# Patient Record
Sex: MALE | Race: WHITE | NOT HISPANIC OR LATINO | Employment: FULL TIME | URBAN - METROPOLITAN AREA
[De-identification: names, ages, dates, MRNs, and addresses within clinical notes are randomized per-mention and may not be internally consistent; named-entity substitution may affect disease eponyms.]

---

## 2017-09-01 ENCOUNTER — OFFICE VISIT (OUTPATIENT)
Dept: CARDIOLOGY | Facility: CLINIC | Age: 47
End: 2017-09-01

## 2017-09-01 VITALS
DIASTOLIC BLOOD PRESSURE: 128 MMHG | HEIGHT: 76 IN | HEART RATE: 114 BPM | BODY MASS INDEX: 28.01 KG/M2 | SYSTOLIC BLOOD PRESSURE: 200 MMHG | WEIGHT: 230 LBS | RESPIRATION RATE: 18 BRPM

## 2017-09-01 DIAGNOSIS — I10 ESSENTIAL HYPERTENSION: Primary | ICD-10-CM

## 2017-09-01 PROCEDURE — 93000 ELECTROCARDIOGRAM COMPLETE: CPT | Performed by: INTERNAL MEDICINE

## 2017-09-01 PROCEDURE — 99204 OFFICE O/P NEW MOD 45 MIN: CPT | Performed by: INTERNAL MEDICINE

## 2017-09-01 RX ORDER — AMLODIPINE BESYLATE 5 MG/1
10 TABLET ORAL
COMMUNITY
End: 2017-10-20 | Stop reason: SDUPTHER

## 2017-09-01 RX ORDER — LISINOPRIL 10 MG/1
10 TABLET ORAL DAILY
COMMUNITY
End: 2017-09-01

## 2017-09-01 RX ORDER — LISINOPRIL 20 MG/1
20 TABLET ORAL DAILY
Qty: 90 TABLET | Refills: 3 | Status: SHIPPED | OUTPATIENT
Start: 2017-09-01 | End: 2018-09-06 | Stop reason: SDUPTHER

## 2017-09-01 NOTE — PROGRESS NOTES
Date of Office Visit: 2017  Encounter Provider: Luke Vza MD  Place of Service: [facility] CARDIOLOGY  Patient Name: Johnathon Bright  :1970  [id number]    Chief Complaint   Patient presents with   • Hypertension   • Family History of Heart Dz   :     HPI: Johnathon Bright is a 46 y.o. male  This is a new patient here for evaluation of his blood pressure.  He has had high blood pressure that was diagnosed in July, although he thinks it might have been higher before that. He went in, in July, to [facility] for a sinus infection. He has been started on amlodipine and on lisinopril and says it is like in the 130s-140s now.  He does not have any symptoms of chest pain, shortness of breath, PND, orthopnea, edema, syncope, or palpitations.  He does not think that he is eating a lot of sodium. He chews tobacco.  He drinks Mt. Dew in the morning.  He drinks sweet tea.  He exercises twice a week and does feel better after he exercises.  He has not had any visual problems.  He has not had any blood in his urine.  He has not had any chest pain, shortness of breath, PND, orthopnea, edema, syncope, or palpitations.  He has not had any change in his bowels and he feels like his sodium intake is better, although he eats out 3 times a week. He does not drink excessive alcohol.  He has been checked for sleep apnea and it does not appear that he has it.        Past Medical History:   Diagnosis Date   • Allergy    • Elevated blood pressure    • Sinusitis        Past Surgical History:   Procedure Laterality Date   • ARM HARDWARE REMOVAL         Social History     Social History   • Marital status:      Spouse name: N/A   • Number of children: N/A   • Years of education: N/A     Occupational History   • Not on file.     Social History Main Topics   • Smoking status: Former Smoker     Packs/day: 1.00     Quit date: 2017   • Smokeless tobacco: Current User     Types: Chew      Comment:  "Caffeine - 1   • Alcohol use Yes      Comment: 2 on the weekends   • Drug use: No   • Sexual activity: Not on file     Other Topics Concern   • Not on file     Social History Narrative       Family History   Problem Relation Age of Onset   • Hypertension Mother    • Sudden death Father    • Hypertension Father    • Heart attack Father    • Arrhythmia Paternal Grandmother    • Obesity Paternal Grandmother    • Heart disease Paternal Grandmother    • Pancreatic cancer Paternal Grandfather    • Dementia Paternal Grandfather    • Arrhythmia Sister        Review of Systems   Constitution: Negative for decreased appetite, fever, malaise/fatigue and weight loss.   HENT: Negative for nosebleeds.    Eyes: Negative for double vision.   Cardiovascular: Negative for chest pain, claudication, cyanosis, dyspnea on exertion, irregular heartbeat, leg swelling, near-syncope, orthopnea, palpitations, paroxysmal nocturnal dyspnea and syncope.   Respiratory: Negative for cough, hemoptysis and shortness of breath.    Hematologic/Lymphatic: Negative for bleeding problem.   Skin: Negative for rash.   Musculoskeletal: Negative for falls and myalgias.   Gastrointestinal: Negative for hematochezia, jaundice, melena, nausea and vomiting.   Genitourinary: Negative for hematuria.   Neurological: Negative for dizziness and seizures.   Psychiatric/Behavioral: Negative for altered mental status and memory loss.       No Known Allergies      Current Outpatient Prescriptions:   •  amLODIPine (NORVASC) 5 MG tablet, Take 10 mg by mouth every night at bedtime., Disp: , Rfl:   •  lisinopril (PRINIVIL,ZESTRIL) 20 MG tablet, Take 1 tablet by mouth Daily., Disp: 90 tablet, Rfl: 3     Objective:     Vitals:    09/01/17 1303 09/01/17 1304   BP: 162/92 (!) 200/128   BP Location: Right arm Left arm   Pulse: 114    Resp: 18    Weight: 230 lb (104 kg)    Height: 76\" (193 cm)      Body mass index is 28 kg/(m^2).    Physical Exam   Constitutional: He is oriented " to person, place, and time. He appears well-developed and well-nourished.   HENT:   Head: Normocephalic.   Eyes: No scleral icterus.   Neck: No JVD present. No thyromegaly present.   Cardiovascular: Normal rate, regular rhythm and normal heart sounds.  Exam reveals no gallop and no friction rub.    No murmur heard.  Pulmonary/Chest: Effort normal and breath sounds normal. He has no wheezes. He has no rales.   Abdominal: Soft. There is no hepatosplenomegaly. There is no tenderness.   Musculoskeletal: Normal range of motion. He exhibits no edema.   Lymphadenopathy:     He has no cervical adenopathy.   Neurological: He is alert and oriented to person, place, and time.   Skin: Skin is warm and dry. No rash noted.   Psychiatric: He has a normal mood and affect.         ECG 12 Lead  Date/Time: 9/1/2017 1:31 PM  Performed by: SUNDAR JOE  Authorized by: SUNDAR JOE   Comparison: compared with previous ECG   Similar to previous ECG  Rhythm: sinus tachycardia  Clinical impression: abnormal ECG  Comments: ns ST-T wave abnormality             Assessment:       Diagnosis Plan   1. Essential hypertension            Plan:       I think the patient probably just has essential hypertension. I would like for him to make some lifestyle changes. I have asked him to check his blood pressure once a day, increase his activity, cut the Mountain Dew out, cut the nicotine out, make sure his salt intake his low. I am going to increase his lisinopril to 20 a day. I am going to ask him to come see Marisol Ashley PA-C in about 6 weeks. If his blood pressure is still high, then I probably would add chlorthalidone to this mix, and if it continues to stay high, we would have to consider doing a secondary workup, but I do not think that he has that. I do not know what to make of the differential in the blood pressures from one arm to the next because it is his left arm that is actually higher, but he has a great pulse in his arm. I am okay  watching it.         As always, it has been a pleasure to participate in your patient's care.      Sincerely,       Luke Vaz MD

## 2017-10-20 ENCOUNTER — OFFICE VISIT (OUTPATIENT)
Dept: CARDIOLOGY | Facility: CLINIC | Age: 47
End: 2017-10-20

## 2017-10-20 VITALS
SYSTOLIC BLOOD PRESSURE: 168 MMHG | DIASTOLIC BLOOD PRESSURE: 98 MMHG | OXYGEN SATURATION: 99 % | HEART RATE: 79 BPM | WEIGHT: 230 LBS | HEIGHT: 76 IN | BODY MASS INDEX: 28.01 KG/M2

## 2017-10-20 DIAGNOSIS — I10 ESSENTIAL HYPERTENSION: Primary | ICD-10-CM

## 2017-10-20 PROCEDURE — 93000 ELECTROCARDIOGRAM COMPLETE: CPT | Performed by: PHYSICIAN ASSISTANT

## 2017-10-20 PROCEDURE — 99213 OFFICE O/P EST LOW 20 MIN: CPT | Performed by: PHYSICIAN ASSISTANT

## 2017-10-20 RX ORDER — CHLORTHALIDONE 25 MG/1
12.5 TABLET ORAL DAILY
Qty: 15 TABLET | Refills: 11 | Status: SHIPPED | OUTPATIENT
Start: 2017-10-20 | End: 2017-10-31 | Stop reason: DRUGHIGH

## 2017-10-20 RX ORDER — AMLODIPINE BESYLATE 5 MG/1
10 TABLET ORAL DAILY
Qty: 60 TABLET | Refills: 11 | Status: SHIPPED | OUTPATIENT
Start: 2017-10-20 | End: 2018-10-18 | Stop reason: SDUPTHER

## 2017-10-20 NOTE — PROGRESS NOTES
Date of Office Visit: 10/20/2017  Encounter Provider: KAREN Villalobos  Place of Service: [facility] CARDIOLOGY  Patient Name: Johnathon Bright  :1970    Chief Complaint   Patient presents with   • Hypertension     6 week follow up   :     HPI: Johnathon Bright is a 46 y.o. male, new to me, who presents today for follow-up.  Old records have been obtained and reviewed by me.He is a patient who was first evaluated by Dr. Vaz on 2017 for evaluation of his high blood pressure.  In July he was diagnosed with high blood pressure and started on Norvasc and lisinopril.  He then came to our office for an evaluation.  Dr. Vaz recommended that he really watch his salt intake, stopped drinking at Mountain Dew, quit smoking, and exercise daily.  He also asked him to check his blood pressure once a day and he increased his lisinopril to 20 mg daily.  He is here today for 6 week follow-up.  Dr. Vaz felt that if his blood pressure was still elevated, we could try adding chlorthalidone.   Over the past 6 weeks he is trying to make some changes to his lifestyle.  He is walking 2-1/2 miles or 4 times a week.  He has started to cut back on the smokeless nicotine.  He is down to 1 caffeine free Mountain Dew a day.  He has been really diligent about watching his salt intake, and he has really cut back the amount of times he is eating out a week.  Overall he feels like he is eating a lot of positive changes.  His blood pressure at home runs in the 130s to 140s systolic.  He denies any chest pain, palpitations, shortness of breath, dizziness, edema, or syncope.    Past Medical History:   Diagnosis Date   • Allergy    • Arm fracture    • Elevated blood pressure    • Hypertension    • Sinusitis        Past Surgical History:   Procedure Laterality Date   • ARM HARDWARE REMOVAL         Social History     Social History   • Marital status:      Spouse name: N/A   • Number of children: N/A    • Years of education: N/A     Occupational History   • Not on file.     Social History Main Topics   • Smoking status: Former Smoker     Packs/day: 1.00     Quit date: 02/2017   • Smokeless tobacco: Current User     Types: Chew      Comment: Caffeine - 1   • Alcohol use 0.6 oz/week     1 Shots of liquor per week      Comment: 2 on the weekends   • Drug use: No   • Sexual activity: Defer     Other Topics Concern   • Not on file     Social History Narrative       Family History   Problem Relation Age of Onset   • Hypertension Mother    • Sudden death Father    • Hypertension Father    • Heart attack Father    • Arrhythmia Paternal Grandmother    • Obesity Paternal Grandmother    • Heart disease Paternal Grandmother    • Pancreatic cancer Paternal Grandfather    • Dementia Paternal Grandfather    • Arrhythmia Sister    • No Known Problems Maternal Grandmother    • No Known Problems Maternal Grandfather        Review of Systems   Constitution: Negative for chills, fever, malaise/fatigue, weight gain and weight loss.   HENT: Negative for ear pain, hearing loss, nosebleeds and sore throat.    Eyes: Negative for double vision, pain and visual disturbance.   Cardiovascular: Negative for chest pain, dyspnea on exertion, irregular heartbeat, leg swelling, near-syncope, orthopnea, palpitations, paroxysmal nocturnal dyspnea and syncope.   Respiratory: Negative for cough, shortness of breath, sleep disturbances due to breathing, snoring and wheezing.    Endocrine: Negative for cold intolerance, heat intolerance and polyuria.   Skin: Negative for itching and rash.   Musculoskeletal: Negative for joint pain, joint swelling and myalgias.   Gastrointestinal: Negative for abdominal pain, diarrhea, melena, nausea and vomiting.   Genitourinary: Negative for frequency, hematuria and hesitancy.   Neurological: Negative for excessive daytime sleepiness, headaches, light-headedness, numbness, paresthesias and seizures.  "  Psychiatric/Behavioral: Negative for altered mental status and depression.   Allergic/Immunologic: Negative.    All other systems reviewed and are negative.      No Known Allergies      Current Outpatient Prescriptions:   •  amLODIPine (NORVASC) 5 MG tablet, Take 10 mg by mouth every night at bedtime., Disp: , Rfl:   •  lisinopril (PRINIVIL,ZESTRIL) 20 MG tablet, Take 1 tablet by mouth Daily., Disp: 90 tablet, Rfl: 3     Objective:     Vitals:    10/20/17 1258 10/20/17 1313   BP: 152/100 168/98   BP Location: Right arm Left arm   Pulse: 79    SpO2: 99%    Weight: 230 lb (104 kg)    Height: 76\" (193 cm)      Body mass index is 28 kg/(m^2).    PHYSICAL EXAM:    Physical Exam   Constitutional: He is oriented to person, place, and time. He appears well-developed and well-nourished. No distress.   HENT:   Head: Normocephalic and atraumatic.   Eyes: Pupils are equal, round, and reactive to light.   Neck: No JVD present. No thyromegaly present.   Cardiovascular: Normal rate, regular rhythm, normal heart sounds and intact distal pulses.    No murmur heard.  Pulmonary/Chest: Effort normal and breath sounds normal. No respiratory distress.   Abdominal: Soft. Bowel sounds are normal. He exhibits no distension. There is no splenomegaly or hepatomegaly. There is no tenderness.   Musculoskeletal: Normal range of motion. He exhibits no edema.   Neurological: He is alert and oriented to person, place, and time.   Skin: Skin is warm and dry. He is not diaphoretic. No erythema.   Psychiatric: He has a normal mood and affect. His behavior is normal. Judgment normal.         ECG 12 Lead  Date/Time: 10/20/2017 1:16 PM  Performed by: VEE AVILES.  Authorized by: VEE AVILES.   Comparison: compared with previous ECG from 9/1/2017  Similar to previous ECG  Rhythm: sinus rhythm  BPM: 79  T depression: III and aVF  T flattening: V1  Clinical impression: abnormal ECG  Comments: Indication: Hypertension              Assessment:       " Diagnosis Plan   1. Essential hypertension  ECG 12 Lead     Orders Placed This Encounter   Procedures   • ECG 12 Lead     This order was created via procedure documentation          Plan:       His blood pressure is still elevated today, and it has been a little elevated at home.  I did discuss this with Dr. Vaz.  We are going to start him on chlorthalidone 12.5 mg daily.  He will come back in 10 days for a blood pressure check, I've asked him to bring his cuff with him.  He will also have a BMP drawn at that time as well.  Further recommendations will be made pending those results.    As always, it has been a pleasure to participate in your patient's care.      Sincerely,         Marisol Ashley PA-C

## 2017-10-31 ENCOUNTER — CLINICAL SUPPORT (OUTPATIENT)
Dept: CARDIOLOGY | Facility: CLINIC | Age: 47
End: 2017-10-31

## 2017-10-31 ENCOUNTER — LAB (OUTPATIENT)
Dept: LAB | Facility: HOSPITAL | Age: 47
End: 2017-10-31

## 2017-10-31 DIAGNOSIS — I10 ESSENTIAL HYPERTENSION: ICD-10-CM

## 2017-10-31 LAB
ANION GAP SERPL CALCULATED.3IONS-SCNC: 15.1 MMOL/L
BUN BLD-MCNC: 17 MG/DL (ref 6–20)
BUN/CREAT SERPL: 14.2 (ref 7–25)
CALCIUM SPEC-SCNC: 9.7 MG/DL (ref 8.6–10.5)
CHLORIDE SERPL-SCNC: 96 MMOL/L (ref 98–107)
CO2 SERPL-SCNC: 25.9 MMOL/L (ref 22–29)
CREAT BLD-MCNC: 1.2 MG/DL (ref 0.76–1.27)
GFR SERPL CREATININE-BSD FRML MDRD: 66 ML/MIN/1.73
GLUCOSE BLD-MCNC: 109 MG/DL (ref 65–99)
POTASSIUM BLD-SCNC: 4.2 MMOL/L (ref 3.5–5.2)
SODIUM BLD-SCNC: 137 MMOL/L (ref 136–145)

## 2017-10-31 PROCEDURE — 80048 BASIC METABOLIC PNL TOTAL CA: CPT

## 2017-10-31 PROCEDURE — 36415 COLL VENOUS BLD VENIPUNCTURE: CPT

## 2017-10-31 RX ORDER — CHLORTHALIDONE 25 MG/1
TABLET ORAL
Qty: 45 TABLET | Refills: 0 | Status: SHIPPED | OUTPATIENT
Start: 2017-10-31 | End: 2019-01-10 | Stop reason: SDUPTHER

## 2018-01-15 ENCOUNTER — OFFICE VISIT (OUTPATIENT)
Dept: CARDIOLOGY | Facility: CLINIC | Age: 48
End: 2018-01-15

## 2018-01-15 VITALS
DIASTOLIC BLOOD PRESSURE: 82 MMHG | BODY MASS INDEX: 28.23 KG/M2 | HEART RATE: 90 BPM | WEIGHT: 231.8 LBS | SYSTOLIC BLOOD PRESSURE: 124 MMHG | HEIGHT: 76 IN

## 2018-01-15 DIAGNOSIS — I10 ESSENTIAL HYPERTENSION: Primary | ICD-10-CM

## 2018-01-15 PROCEDURE — 99213 OFFICE O/P EST LOW 20 MIN: CPT | Performed by: INTERNAL MEDICINE

## 2018-01-15 PROCEDURE — 93000 ELECTROCARDIOGRAM COMPLETE: CPT | Performed by: INTERNAL MEDICINE

## 2018-01-15 NOTE — PROGRESS NOTES
Date of Office Visit: 01/15/2018  Encounter Provider: Luke Vaz MD  Place of Service: [facility] CARDIOLOGY  Patient Name: Johnathon Bright  :1970  [id number]    Chief Complaint   Patient presents with   • Hypertension   :     HPI: Johnathon Bright is a 47 y.o. male  This is a gentleman is here for followup of hypertension.  He has been doing well.  His blood pressure runs 127 or so over 77 he said on average; it may fluctuate a couple points here or there.  He will get a little lightheaded sometimes when he bends over and stands up.  He is also having erectile dysfunction issues, which he has had before.  The good news is that he has stopped smoking, but the erectile dysfunction problem is obviously a major issue for him.         Past Medical History:   Diagnosis Date   • Allergy    • Arm fracture    • Elevated blood pressure    • Hypertension    • Sinusitis        Past Surgical History:   Procedure Laterality Date   • ARM HARDWARE REMOVAL         Social History     Social History   • Marital status:      Spouse name: N/A   • Number of children: N/A   • Years of education: N/A     Occupational History   • Not on file.     Social History Main Topics   • Smoking status: Former Smoker     Packs/day: 1.00     Quit date: 2017   • Smokeless tobacco: Current User     Types: Chew      Comment: Caffeine - 1   • Alcohol use 0.6 oz/week     1 Shots of liquor per week      Comment: 2 on the weekends   • Drug use: No   • Sexual activity: Defer     Other Topics Concern   • Not on file     Social History Narrative       Family History   Problem Relation Age of Onset   • Hypertension Mother    • Sudden death Father    • Hypertension Father    • Heart attack Father    • Arrhythmia Paternal Grandmother    • Obesity Paternal Grandmother    • Heart disease Paternal Grandmother    • Pancreatic cancer Paternal Grandfather    • Dementia Paternal Grandfather    • Arrhythmia Sister    • No  "Known Problems Maternal Grandmother    • No Known Problems Maternal Grandfather        Review of Systems   Constitution: Negative for decreased appetite, fever, malaise/fatigue and weight loss.   HENT: Negative for nosebleeds.    Eyes: Negative for double vision.   Cardiovascular: Negative for chest pain, claudication, cyanosis, dyspnea on exertion, irregular heartbeat, leg swelling, near-syncope, orthopnea, palpitations, paroxysmal nocturnal dyspnea and syncope.   Respiratory: Negative for cough, hemoptysis and shortness of breath.    Hematologic/Lymphatic: Negative for bleeding problem.   Skin: Negative for rash.   Musculoskeletal: Negative for falls and myalgias.   Gastrointestinal: Negative for hematochezia, jaundice, melena, nausea and vomiting.   Genitourinary: Negative for hematuria.   Neurological: Negative for dizziness and seizures.   Psychiatric/Behavioral: Negative for altered mental status and memory loss.       No Known Allergies      Current Outpatient Prescriptions:   •  amLODIPine (NORVASC) 5 MG tablet, Take 2 tablets by mouth Daily., Disp: 60 tablet, Rfl: 11  •  chlorthalidone (HYGROTON) 25 MG tablet, Take 1/2 tablet daily, Disp: 45 tablet, Rfl: 0  •  lisinopril (PRINIVIL,ZESTRIL) 20 MG tablet, Take 1 tablet by mouth Daily., Disp: 90 tablet, Rfl: 3     Objective:     Vitals:    01/15/18 1424   BP: 124/82   Pulse: 90   Weight: 105 kg (231 lb 12.8 oz)   Height: 193 cm (76\")     Body mass index is 28.22 kg/(m^2).    Physical Exam   Constitutional: He is oriented to person, place, and time. He appears well-developed and well-nourished.   HENT:   Head: Normocephalic.   Eyes: No scleral icterus.   Neck: No JVD present. No thyromegaly present.   Cardiovascular: Normal rate, regular rhythm and normal heart sounds.  Exam reveals no gallop and no friction rub.    No murmur heard.  Pulmonary/Chest: Effort normal and breath sounds normal. He has no wheezes. He has no rales.   Abdominal: Soft. There is no " hepatosplenomegaly. There is no tenderness.   Musculoskeletal: Normal range of motion. He exhibits no edema.   Lymphadenopathy:     He has no cervical adenopathy.   Neurological: He is alert and oriented to person, place, and time.   Skin: Skin is warm and dry. No rash noted.   Psychiatric: He has a normal mood and affect.         ECG 12 Lead  Date/Time: 1/15/2018 3:05 PM  Performed by: SUNDAR VAZ  Authorized by: SUNDAR VAZ   Comparison: compared with previous ECG   Similar to previous ECG  Rhythm: sinus rhythm             Assessment:       Diagnosis Plan   1. Essential hypertension            Plan:       He is doing well.  He has made a lot of positive changes to get better.  I am not going to make any change today.  He is having some orthostasis when he bends over and stands up.  I am going to have him come back and see us as needed.  He could definitely take erectile dysfunction medicine, if he needed to, from a cardiac standpoint.        As always, it has been a pleasure to participate in your patient's care.      Sincerely,       Sundar Vaz MD

## 2018-09-06 RX ORDER — LISINOPRIL 20 MG/1
20 TABLET ORAL DAILY
Qty: 90 TABLET | Refills: 0 | Status: SHIPPED | OUTPATIENT
Start: 2018-09-06 | End: 2018-12-04 | Stop reason: SDUPTHER

## 2018-10-19 RX ORDER — AMLODIPINE BESYLATE 5 MG/1
10 TABLET ORAL DAILY
Qty: 60 TABLET | Refills: 3 | Status: SHIPPED | OUTPATIENT
Start: 2018-10-19 | End: 2019-02-16 | Stop reason: SDUPTHER

## 2018-12-05 RX ORDER — LISINOPRIL 20 MG/1
20 TABLET ORAL DAILY
Qty: 90 TABLET | Refills: 0 | Status: SHIPPED | OUTPATIENT
Start: 2018-12-05 | End: 2019-02-28 | Stop reason: SDUPTHER

## 2019-01-10 RX ORDER — CHLORTHALIDONE 25 MG/1
TABLET ORAL
Qty: 45 TABLET | Refills: 0 | Status: SHIPPED | OUTPATIENT
Start: 2019-01-10 | End: 2019-02-28 | Stop reason: SDUPTHER

## 2019-02-18 RX ORDER — AMLODIPINE BESYLATE 5 MG/1
10 TABLET ORAL DAILY
Qty: 60 TABLET | Refills: 0 | Status: SHIPPED | OUTPATIENT
Start: 2019-02-18 | End: 2019-02-18 | Stop reason: SDUPTHER

## 2019-02-18 RX ORDER — AMLODIPINE BESYLATE 5 MG/1
10 TABLET ORAL DAILY
Qty: 60 TABLET | Refills: 0 | Status: SHIPPED | OUTPATIENT
Start: 2019-02-18 | End: 2019-02-28 | Stop reason: SDUPTHER

## 2019-02-18 NOTE — TELEPHONE ENCOUNTER
Ghazal can you please call and schedule a follow up appointment.  Marisol chavez I send in one month for the patient

## 2019-02-20 RX ORDER — AMLODIPINE BESYLATE 5 MG/1
10 TABLET ORAL DAILY
Qty: 180 TABLET | Refills: 0 | Status: SHIPPED | OUTPATIENT
Start: 2019-02-20 | End: 2019-02-28

## 2019-02-28 ENCOUNTER — OFFICE VISIT (OUTPATIENT)
Dept: CARDIOLOGY | Facility: CLINIC | Age: 49
End: 2019-02-28

## 2019-02-28 ENCOUNTER — LAB (OUTPATIENT)
Dept: LAB | Facility: HOSPITAL | Age: 49
End: 2019-02-28

## 2019-02-28 VITALS
SYSTOLIC BLOOD PRESSURE: 128 MMHG | WEIGHT: 238 LBS | BODY MASS INDEX: 29.59 KG/M2 | OXYGEN SATURATION: 99 % | HEIGHT: 75 IN | HEART RATE: 117 BPM | DIASTOLIC BLOOD PRESSURE: 78 MMHG

## 2019-02-28 DIAGNOSIS — I10 ESSENTIAL HYPERTENSION: ICD-10-CM

## 2019-02-28 DIAGNOSIS — R00.0 TACHYCARDIA: ICD-10-CM

## 2019-02-28 DIAGNOSIS — I10 ESSENTIAL HYPERTENSION: Primary | ICD-10-CM

## 2019-02-28 LAB
ALBUMIN SERPL-MCNC: 4.6 G/DL (ref 3.5–5.2)
ALBUMIN/GLOB SERPL: 1.4 G/DL
ALP SERPL-CCNC: 48 U/L (ref 39–117)
ALT SERPL W P-5'-P-CCNC: 34 U/L (ref 1–41)
ANION GAP SERPL CALCULATED.3IONS-SCNC: 10 MMOL/L
AST SERPL-CCNC: 18 U/L (ref 1–40)
BILIRUB SERPL-MCNC: 0.3 MG/DL (ref 0.1–1.2)
BUN BLD-MCNC: 13 MG/DL (ref 6–20)
BUN/CREAT SERPL: 11.6 (ref 7–25)
CALCIUM SPEC-SCNC: 10.1 MG/DL (ref 8.6–10.5)
CHLORIDE SERPL-SCNC: 100 MMOL/L (ref 98–107)
CO2 SERPL-SCNC: 27 MMOL/L (ref 22–29)
CREAT BLD-MCNC: 1.12 MG/DL (ref 0.76–1.27)
DEPRECATED RDW RBC AUTO: 39.9 FL (ref 37–54)
ERYTHROCYTE [DISTWIDTH] IN BLOOD BY AUTOMATED COUNT: 12.4 % (ref 12.3–15.4)
GFR SERPL CREATININE-BSD FRML MDRD: 71 ML/MIN/1.73
GLOBULIN UR ELPH-MCNC: 3.3 GM/DL
GLUCOSE BLD-MCNC: 122 MG/DL (ref 65–99)
HCT VFR BLD AUTO: 46.7 % (ref 37.5–51)
HGB BLD-MCNC: 15.5 G/DL (ref 13–17.7)
MCH RBC QN AUTO: 29.2 PG (ref 26.6–33)
MCHC RBC AUTO-ENTMCNC: 33.2 G/DL (ref 31.5–35.7)
MCV RBC AUTO: 88.1 FL (ref 79–97)
PLATELET # BLD AUTO: 362 10*3/MM3 (ref 140–450)
PMV BLD AUTO: 8.8 FL (ref 6–12)
POTASSIUM BLD-SCNC: 4.4 MMOL/L (ref 3.5–5.2)
PROT SERPL-MCNC: 7.9 G/DL (ref 6–8.5)
RBC # BLD AUTO: 5.3 10*6/MM3 (ref 4.14–5.8)
SODIUM BLD-SCNC: 137 MMOL/L (ref 136–145)
T-UPTAKE NFR SERPL: 0.98 TBI (ref 0.8–1.3)
T4 SERPL-MCNC: 5.57 MCG/DL (ref 4.5–11.7)
TSH SERPL DL<=0.05 MIU/L-ACNC: 1.07 MIU/ML (ref 0.27–4.2)
WBC NRBC COR # BLD: 8.82 10*3/MM3 (ref 3.4–10.8)

## 2019-02-28 PROCEDURE — 85027 COMPLETE CBC AUTOMATED: CPT

## 2019-02-28 PROCEDURE — 80053 COMPREHEN METABOLIC PANEL: CPT

## 2019-02-28 PROCEDURE — 36415 COLL VENOUS BLD VENIPUNCTURE: CPT

## 2019-02-28 PROCEDURE — 99214 OFFICE O/P EST MOD 30 MIN: CPT | Performed by: NURSE PRACTITIONER

## 2019-02-28 PROCEDURE — 84443 ASSAY THYROID STIM HORMONE: CPT

## 2019-02-28 PROCEDURE — 84479 ASSAY OF THYROID (T3 OR T4): CPT

## 2019-02-28 PROCEDURE — 93000 ELECTROCARDIOGRAM COMPLETE: CPT | Performed by: NURSE PRACTITIONER

## 2019-02-28 PROCEDURE — 84436 ASSAY OF TOTAL THYROXINE: CPT

## 2019-02-28 RX ORDER — LISINOPRIL 20 MG/1
20 TABLET ORAL DAILY
Qty: 90 TABLET | Refills: 0 | Status: SHIPPED | OUTPATIENT
Start: 2019-02-28 | End: 2019-03-26 | Stop reason: SDUPTHER

## 2019-02-28 RX ORDER — LISINOPRIL 20 MG/1
20 TABLET ORAL DAILY
Qty: 30 TABLET | Refills: 11 | Status: SHIPPED | OUTPATIENT
Start: 2019-02-28 | End: 2020-02-28

## 2019-02-28 RX ORDER — AMLODIPINE BESYLATE 5 MG/1
10 TABLET ORAL DAILY
Qty: 60 TABLET | Refills: 11 | Status: SHIPPED | OUTPATIENT
Start: 2019-02-28 | End: 2019-03-26 | Stop reason: SDUPTHER

## 2019-02-28 RX ORDER — CHLORTHALIDONE 25 MG/1
TABLET ORAL
Qty: 45 TABLET | Refills: 11 | Status: SHIPPED | OUTPATIENT
Start: 2019-02-28 | End: 2020-04-02

## 2019-02-28 NOTE — PROGRESS NOTES
Date of Office Visit: 2019  Encounter Provider: ROBIN Tena  Place of Service: [facility] CARDIOLOGY  Patient Name: Johnathon Bright  :1970    Chief Complaint   Patient presents with   • Hypertension     1 year follow up   :     HPI: Johnathon Bright is a 48 y.o. male, new to me, who presents today for follow-up.  Old records have been obtained and reviewed by me.  He is a patient of Dr. Vaz's with a past cardiac history significant for hypertension.  He was last seen in the office on 1/15/2018 and at that time his blood pressure had been doing well in the 120s systolic.  He had made a lot of positive changes in his lifestyle including walking and cutting back on smokeless nicotine and Mountain Dew.  He was having some occasional orthostasis when bending over and standing up.  His biggest complaint was some erectile dysfunction.  No changes were made to his medical regimen and he is here today for a one-year follow-up.   Over the past year he has been doing well.  He denies any chest pain, shortness of air, palpitations, edema, and syncope.  He does experience occasional lightheadedness after bending over and standing up too quickly.  His activity has been decreased since December both due to the weather and the fact that he has been pretty busy.  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  Additionally, he has been eating out more frequently due to the busy schedule.  He still occasionally uses a smokeless nicotine.  He reports that his caffeine intake includes one Mountain Dew per day.    Past Medical History:   Diagnosis Date   • Allergy    • Arm fracture    • Elevated blood pressure    • Hypertension    • Sinusitis        Past Surgical History:   Procedure Laterality Date   • ARM HARDWARE REMOVAL         Social History     Socioeconomic History   • Marital status:      Spouse name: Not on file   •  Number of children: Not on file   • Years of education: Not on file   • Highest education level: Not on file   Social Needs   • Financial resource strain: Not on file   • Food insecurity - worry: Not on file   • Food insecurity - inability: Not on file   • Transportation needs - medical: Not on file   • Transportation needs - non-medical: Not on file   Occupational History   • Not on file   Tobacco Use   • Smoking status: Former Smoker     Packs/day: 1.00     Last attempt to quit: 2017     Years since quittin.0   • Smokeless tobacco: Current User     Types: Chew   • Tobacco comment: Caffeine - 1   Substance and Sexual Activity   • Alcohol use: Yes     Alcohol/week: 0.6 oz     Types: 1 Shots of liquor per week     Comment: 2 on the weekends   • Drug use: No   • Sexual activity: Defer   Other Topics Concern   • Not on file   Social History Narrative   • Not on file       Family History   Problem Relation Age of Onset   • Hypertension Mother    • Sudden death Father    • Hypertension Father    • Heart attack Father    • Arrhythmia Paternal Grandmother    • Obesity Paternal Grandmother    • Heart disease Paternal Grandmother    • Pancreatic cancer Paternal Grandfather    • Dementia Paternal Grandfather    • Arrhythmia Sister    • No Known Problems Maternal Grandmother    • No Known Problems Maternal Grandfather        Review of Systems   Constitution: Negative for chills, fever and malaise/fatigue.   Cardiovascular: Negative for chest pain, dyspnea on exertion, leg swelling, near-syncope, orthopnea, palpitations, paroxysmal nocturnal dyspnea and syncope.   Respiratory: Negative for cough and shortness of breath.    Musculoskeletal: Negative for joint pain, joint swelling and myalgias.   Gastrointestinal: Negative for abdominal pain, diarrhea, melena, nausea and vomiting.   Genitourinary: Negative for frequency and hematuria.   Neurological: Positive for light-headedness. Negative for numbness, paresthesias and  "seizures.   Allergic/Immunologic: Negative.    All other systems reviewed and are negative.      No Known Allergies      Current Outpatient Medications:   •  amLODIPine (NORVASC) 5 MG tablet, Take 2 tablets by mouth Daily., Disp: 60 tablet, Rfl: 11  •  chlorthalidone (HYGROTON) 25 MG tablet, TAKE 1/2 TABLET BY MOUTH DAILY, Disp: 45 tablet, Rfl: 11  •  lisinopril (PRINIVIL,ZESTRIL) 20 MG tablet, Take 1 tablet by mouth Daily., Disp: 30 tablet, Rfl: 11      Objective:     Vitals:    02/28/19 1105 02/28/19 1111   BP: 130/76 128/78   BP Location: Right arm Left arm   Pulse: 117    SpO2: 99%    Weight: 108 kg (238 lb)    Height: 190.5 cm (75\")      Body mass index is 29.75 kg/m².    PHYSICAL EXAM:    Physical Exam   Constitutional: He is oriented to person, place, and time. He appears well-developed and well-nourished. No distress.   HENT:   Head: Normocephalic and atraumatic.   Eyes: Pupils are equal, round, and reactive to light.   Neck: No JVD present. No thyromegaly present.   Cardiovascular: Regular rhythm, normal heart sounds and intact distal pulses. Tachycardia present.   No murmur heard.  Pulmonary/Chest: Effort normal and breath sounds normal. No respiratory distress.   Abdominal: Soft. Bowel sounds are normal. He exhibits no distension. There is no splenomegaly or hepatomegaly. There is no tenderness.   Musculoskeletal: Normal range of motion. He exhibits no edema.   Neurological: He is alert and oriented to person, place, and time.   Skin: Skin is warm and dry. He is not diaphoretic. No erythema.   Psychiatric: He has a normal mood and affect. His behavior is normal. Judgment normal.         ECG 12 Lead  Date/Time: 2/28/2019 11:21 AM  Performed by: Fadumo Moreno APRN  Authorized by: Fadumo Moreno APRN   Comparison: compared with previous ECG from 1/15/2018  Similar to previous ECG  Rhythm: sinus tachycardia  Rate: tachycardic  BPM: 104  T inversion: III    Clinical impression: abnormal " EKG  Comments: Indication: Hypertension              Assessment:       Diagnosis Plan   1. Essential hypertension  ECG 12 Lead    CBC (No Diff)    Comprehensive Metabolic Panel    Thyroid Panel With TSH    Adult Transthoracic Echo Complete W/ Cont if Necessary Per Protocol   2. Tachycardia  CBC (No Diff)    Comprehensive Metabolic Panel    Thyroid Panel With TSH    Adult Transthoracic Echo Complete W/ Cont if Necessary Per Protocol     Orders Placed This Encounter   Procedures   • CBC (No Diff)     Standing Status:   Future     Standing Expiration Date:   2/28/2020   • Comprehensive Metabolic Panel     Standing Status:   Future     Standing Expiration Date:   2/28/2020   • Thyroid Panel With TSH     Standing Status:   Future     Standing Expiration Date:   2/28/2020   • ECG 12 Lead     This order was created via procedure documentation   • Adult Transthoracic Echo Complete W/ Cont if Necessary Per Protocol     tachycardia     Standing Status:   Future     Order Specific Question:   Reason for exam?     Answer:   Other Reasons     Order Specific Question:   Other reason(s)?     Answer:   Additional Reasons     Order Specific Question:   Additional reason(s)?     Answer:   Reasons Uncertain in Most Circumstances     Order Specific Question:   Other Reasons (uncertain in most circumstances)     Answer:   Other (Please Comment)          Plan:       1.  Hypertension.  His blood pressure looks great today.  He states they have been well controlled at home as well.  Continue current regimen      2.  Tachycardia.  He is tachycardic today, and it looks as though the heart rate has been trending up over the past few years.  He did have a Mountain Dew before his appointment, and also states that he always gets nervous before coming to the doctor.  However, he did bring his home blood pressure cuff in with him today which also measures his heart rate and his heart rate is consistently in the 100s-1 teens at rest. He denies any  palpitations.  I am not sure why he is tachycardic, so I am going to investigate it further.  I am going to order some labs on him including a CBC, CMP, and thyroid panel.  I am also going to order an echocardiogram.  I did discuss the plan of care with Dr. Rodriguez and he is in agreement.  Further recommendations will be made pending the results of these tests.  I will have him make an appointment to follow-up with Dr. Vaz in 1 year and we will see him sooner if needed.        As always, it has been a pleasure to participate in your patient's care.      Sincerely,         ROBIN Singh

## 2019-03-08 ENCOUNTER — HOSPITAL ENCOUNTER (OUTPATIENT)
Dept: CARDIOLOGY | Facility: HOSPITAL | Age: 49
Discharge: HOME | End: 2019-03-08
Admitting: NURSE PRACTITIONER

## 2019-03-08 VITALS
HEIGHT: 75 IN | WEIGHT: 238 LBS | SYSTOLIC BLOOD PRESSURE: 147 MMHG | HEART RATE: 117 BPM | DIASTOLIC BLOOD PRESSURE: 83 MMHG | BODY MASS INDEX: 29.59 KG/M2

## 2019-03-08 DIAGNOSIS — I10 ESSENTIAL HYPERTENSION: ICD-10-CM

## 2019-03-08 DIAGNOSIS — R00.0 TACHYCARDIA: ICD-10-CM

## 2019-03-08 LAB
ASCENDING AORTA: 2.6 CM
BH CV ECHO MEAS - ACS: 2.1 CM
BH CV ECHO MEAS - AO MAX PG (FULL): 2.3 MMHG
BH CV ECHO MEAS - AO MAX PG: 12.4 MMHG
BH CV ECHO MEAS - AO MEAN PG (FULL): 0.1 MMHG
BH CV ECHO MEAS - AO MEAN PG: 5.8 MMHG
BH CV ECHO MEAS - AO ROOT AREA (BSA CORRECTED): 1.5
BH CV ECHO MEAS - AO ROOT AREA: 10.2 CM^2
BH CV ECHO MEAS - AO ROOT DIAM: 3.6 CM
BH CV ECHO MEAS - AO V2 MAX: 175.9 CM/SEC
BH CV ECHO MEAS - AO V2 MEAN: 110.8 CM/SEC
BH CV ECHO MEAS - AO V2 VTI: 22.8 CM
BH CV ECHO MEAS - AVA(I,A): 2.9 CM^2
BH CV ECHO MEAS - AVA(I,D): 2.9 CM^2
BH CV ECHO MEAS - AVA(V,A): 2.8 CM^2
BH CV ECHO MEAS - AVA(V,D): 2.8 CM^2
BH CV ECHO MEAS - BSA(HAYCOCK): 2.4 M^2
BH CV ECHO MEAS - BSA: 2.4 M^2
BH CV ECHO MEAS - BZI_BMI: 29.7 KILOGRAMS/M^2
BH CV ECHO MEAS - BZI_METRIC_HEIGHT: 190.5 CM
BH CV ECHO MEAS - BZI_METRIC_WEIGHT: 108 KG
BH CV ECHO MEAS - EDV(MOD-SP2): 70 ML
BH CV ECHO MEAS - EDV(MOD-SP4): 105 ML
BH CV ECHO MEAS - EDV(TEICH): 159.5 ML
BH CV ECHO MEAS - EF(CUBED): 67.8 %
BH CV ECHO MEAS - EF(MOD-BP): 57 %
BH CV ECHO MEAS - EF(MOD-SP2): 57.1 %
BH CV ECHO MEAS - EF(MOD-SP4): 56.2 %
BH CV ECHO MEAS - EF(TEICH): 58.6 %
BH CV ECHO MEAS - ESV(MOD-SP2): 30 ML
BH CV ECHO MEAS - ESV(MOD-SP4): 46 ML
BH CV ECHO MEAS - ESV(TEICH): 66 ML
BH CV ECHO MEAS - FS: 31.4 %
BH CV ECHO MEAS - IVS/LVPW: 1
BH CV ECHO MEAS - IVSD: 1 CM
BH CV ECHO MEAS - LAT PEAK E' VEL: 9 CM/SEC
BH CV ECHO MEAS - LV DIASTOLIC VOL/BSA (35-75): 44.4 ML/M^2
BH CV ECHO MEAS - LV MASS(C)D: 231.7 GRAMS
BH CV ECHO MEAS - LV MASS(C)DI: 98.1 GRAMS/M^2
BH CV ECHO MEAS - LV MAX PG: 10.1 MMHG
BH CV ECHO MEAS - LV MEAN PG: 5.7 MMHG
BH CV ECHO MEAS - LV SYSTOLIC VOL/BSA (12-30): 19.5 ML/M^2
BH CV ECHO MEAS - LV V1 MAX: 159.1 CM/SEC
BH CV ECHO MEAS - LV V1 MEAN: 113.9 CM/SEC
BH CV ECHO MEAS - LV V1 VTI: 21.1 CM
BH CV ECHO MEAS - LVIDD: 5.7 CM
BH CV ECHO MEAS - LVIDS: 3.9 CM
BH CV ECHO MEAS - LVLD AP2: 6.2 CM
BH CV ECHO MEAS - LVLD AP4: 7.3 CM
BH CV ECHO MEAS - LVLS AP2: 6.3 CM
BH CV ECHO MEAS - LVLS AP4: 6.3 CM
BH CV ECHO MEAS - LVOT AREA (M): 3.1 CM^2
BH CV ECHO MEAS - LVOT AREA: 3.1 CM^2
BH CV ECHO MEAS - LVOT DIAM: 2 CM
BH CV ECHO MEAS - LVPWD: 1 CM
BH CV ECHO MEAS - MED PEAK E' VEL: 8 CM/SEC
BH CV ECHO MEAS - MV A DUR: 0.08 SEC
BH CV ECHO MEAS - MV A MAX VEL: 100.4 CM/SEC
BH CV ECHO MEAS - MV DEC SLOPE: 506.5 CM/SEC^2
BH CV ECHO MEAS - MV DEC TIME: 0.12 SEC
BH CV ECHO MEAS - MV E MAX VEL: 62.1 CM/SEC
BH CV ECHO MEAS - MV E/A: 0.62
BH CV ECHO MEAS - MV MAX PG: 5.1 MMHG
BH CV ECHO MEAS - MV MEAN PG: 3 MMHG
BH CV ECHO MEAS - MV P1/2T MAX VEL: 63.1 CM/SEC
BH CV ECHO MEAS - MV P1/2T: 36.5 MSEC
BH CV ECHO MEAS - MV V2 MAX: 112.9 CM/SEC
BH CV ECHO MEAS - MV V2 MEAN: 82.7 CM/SEC
BH CV ECHO MEAS - MV V2 VTI: 18.9 CM
BH CV ECHO MEAS - MVA P1/2T LCG: 3.5 CM^2
BH CV ECHO MEAS - MVA(P1/2T): 6 CM^2
BH CV ECHO MEAS - MVA(VTI): 3.5 CM^2
BH CV ECHO MEAS - PA ACC TIME: 0.11 SEC
BH CV ECHO MEAS - PA MAX PG (FULL): 9.8 MMHG
BH CV ECHO MEAS - PA MAX PG: 13 MMHG
BH CV ECHO MEAS - PA PR(ACCEL): 31.5 MMHG
BH CV ECHO MEAS - PA V2 MAX: 180.3 CM/SEC
BH CV ECHO MEAS - PULM A REVS DUR: 0.1 SEC
BH CV ECHO MEAS - PULM A REVS VEL: 42.2 CM/SEC
BH CV ECHO MEAS - PULM DIAS VEL: 50.9 CM/SEC
BH CV ECHO MEAS - PULM S/D: 1.5
BH CV ECHO MEAS - PULM SYS VEL: 77.1 CM/SEC
BH CV ECHO MEAS - PVA(V,A): 1.3 CM^2
BH CV ECHO MEAS - PVA(V,D): 1.3 CM^2
BH CV ECHO MEAS - QP/QS: 0.49
BH CV ECHO MEAS - RV MAX PG: 3.2 MMHG
BH CV ECHO MEAS - RV MEAN PG: 1.6 MMHG
BH CV ECHO MEAS - RV V1 MAX: 88.8 CM/SEC
BH CV ECHO MEAS - RV V1 MEAN: 57 CM/SEC
BH CV ECHO MEAS - RV V1 VTI: 12.2 CM
BH CV ECHO MEAS - RVOT AREA: 2.7 CM^2
BH CV ECHO MEAS - RVOT DIAM: 1.8 CM
BH CV ECHO MEAS - SI(AO): 98.3 ML/M^2
BH CV ECHO MEAS - SI(CUBED): 52.9 ML/M^2
BH CV ECHO MEAS - SI(LVOT): 27.9 ML/M^2
BH CV ECHO MEAS - SI(MOD-SP2): 16.9 ML/M^2
BH CV ECHO MEAS - SI(MOD-SP4): 25 ML/M^2
BH CV ECHO MEAS - SI(TEICH): 39.6 ML/M^2
BH CV ECHO MEAS - SUP REN AO DIAM: 2.1 CM
BH CV ECHO MEAS - SV(AO): 232.2 ML
BH CV ECHO MEAS - SV(CUBED): 125 ML
BH CV ECHO MEAS - SV(LVOT): 65.9 ML
BH CV ECHO MEAS - SV(MOD-SP2): 40 ML
BH CV ECHO MEAS - SV(MOD-SP4): 59 ML
BH CV ECHO MEAS - SV(RVOT): 32.5 ML
BH CV ECHO MEAS - SV(TEICH): 93.5 ML
BH CV ECHO MEAS - TAPSE (>1.6): 1.8 CM2
BH CV ECHO MEASUREMENTS AVERAGE E/E' RATIO: 7.31
BH CV XLRA - RV BASE: 3.4 CM
BH CV XLRA - TDI S': 18 CM/SEC
LEFT ATRIUM VOLUME INDEX: 19 ML/M2
LV EF 2D ECHO EST: 57 %
MAXIMAL PREDICTED HEART RATE: 174 BPM
SINUS: 3 CM
STJ: 2.5 CM
STRESS TARGET HR: 148 BPM

## 2019-03-08 PROCEDURE — 93306 TTE W/DOPPLER COMPLETE: CPT | Performed by: INTERNAL MEDICINE

## 2019-03-08 PROCEDURE — 93306 TTE W/DOPPLER COMPLETE: CPT

## 2019-03-12 ENCOUNTER — TELEPHONE (OUTPATIENT)
Dept: CARDIOLOGY | Facility: CLINIC | Age: 49
End: 2019-03-12

## 2019-03-12 RX ORDER — ATENOLOL 25 MG/1
25 TABLET ORAL DAILY
Qty: 30 TABLET | Refills: 11 | Status: SHIPPED | OUTPATIENT
Start: 2019-03-12 | End: 2020-03-10

## 2019-03-12 NOTE — TELEPHONE ENCOUNTER
Left a message for the patient to call me back regarding his echo results and plan.  I'm going to go ahead and place an order for atenolol 25 mg daily.  I will explain this to his when he calls back.

## 2019-03-12 NOTE — TELEPHONE ENCOUNTER
I spoke to the patient regarding the results of his echocardiogram.  I also told him that I was going to order atenolol.  I would like for him to come back and see me in 2 weeks.  Ghazal, can you arrange this appointment and give the patient a call please?

## 2019-03-19 RX ORDER — AMLODIPINE BESYLATE 5 MG/1
10 TABLET ORAL DAILY
Qty: 60 TABLET | Refills: 0 | Status: SHIPPED | OUTPATIENT
Start: 2019-03-19 | End: 2019-04-17 | Stop reason: SDUPTHER

## 2019-03-26 ENCOUNTER — OFFICE VISIT (OUTPATIENT)
Dept: CARDIOLOGY | Facility: CLINIC | Age: 49
End: 2019-03-26

## 2019-03-26 VITALS
BODY MASS INDEX: 29.22 KG/M2 | WEIGHT: 235 LBS | OXYGEN SATURATION: 99 % | HEART RATE: 79 BPM | HEIGHT: 75 IN | DIASTOLIC BLOOD PRESSURE: 92 MMHG | SYSTOLIC BLOOD PRESSURE: 146 MMHG

## 2019-03-26 DIAGNOSIS — I10 ESSENTIAL HYPERTENSION: Primary | ICD-10-CM

## 2019-03-26 PROCEDURE — 99213 OFFICE O/P EST LOW 20 MIN: CPT | Performed by: NURSE PRACTITIONER

## 2019-03-26 PROCEDURE — 93000 ELECTROCARDIOGRAM COMPLETE: CPT | Performed by: NURSE PRACTITIONER

## 2019-03-26 NOTE — PROGRESS NOTES
Date of Office Visit: 2019  Encounter Provider: ROBIN Tena  Place of Service: [facility] CARDIOLOGY  Patient Name: Johnathon Bright  :1970    Chief Complaint   Patient presents with   • Essential hypertension   :     HPI: Johnathon Bright is a 48 y.o. male who presents today for follow-up.  Old records have been obtained and reviewed by me.  He is a patient of Dr. Gamino with a past cardiac history significant for hypertension.  I last saw him on 2019 and at that visit he was doing well with no complaints of angina or heart failure.  However, I did note him to be tachycardic on his EKG and his heart rate readinds on his home blood pressure cuff had been consistenly in the 1 teens.  I ordered some lab work and an echocardiogram all of which were unrevealing.  I started him on atenolol 25 mg daily and advised him to follow-up with me in 2 weeks.   Since starting on the atenolol he has been doing really well.  He denies any chest pain, shortness of air, palpitations, edema, dizziness or lightheadedness, and syncope.  His blood pressures at home have been consistently running in the 120s-130 systolic.  His heart rate has been in the 70s and 80s.       Past Medical History:   Diagnosis Date   • Allergy    • Arm fracture    • Elevated blood pressure    • Hypertension    • Sinusitis        Past Surgical History:   Procedure Laterality Date   • ARM HARDWARE REMOVAL         Social History     Socioeconomic History   • Marital status:      Spouse name: Not on file   • Number of children: Not on file   • Years of education: Not on file   • Highest education level: Not on file   Tobacco Use   • Smoking status: Former Smoker     Packs/day: 1.00     Last attempt to quit: 2017     Years since quittin.1   • Smokeless tobacco: Current User     Types: Chew   • Tobacco comment: Caffeine - 1   Substance and Sexual Activity   • Alcohol use: Yes     Alcohol/week: 0.6  "oz     Types: 1 Shots of liquor per week     Comment: 2 on the weekends   • Drug use: No   • Sexual activity: Yes     Partners: Female       Family History   Problem Relation Age of Onset   • Hypertension Mother    • Sudden death Father    • Hypertension Father    • Heart attack Father    • Arrhythmia Paternal Grandmother    • Obesity Paternal Grandmother    • Heart disease Paternal Grandmother    • Pancreatic cancer Paternal Grandfather    • Dementia Paternal Grandfather    • Arrhythmia Sister    • No Known Problems Maternal Grandmother    • No Known Problems Maternal Grandfather        Review of Systems   Constitution: Negative for chills, fever and malaise/fatigue.   Cardiovascular: Negative for chest pain, dyspnea on exertion, leg swelling, near-syncope, orthopnea, palpitations, paroxysmal nocturnal dyspnea and syncope.   Respiratory: Negative for cough and shortness of breath.    Musculoskeletal: Negative for joint pain, joint swelling and myalgias.   Gastrointestinal: Negative for abdominal pain, diarrhea, melena, nausea and vomiting.   Genitourinary: Negative for frequency and hematuria.   Neurological: Negative for light-headedness, numbness, paresthesias and seizures.   Allergic/Immunologic: Negative.    All other systems reviewed and are negative.      No Known Allergies      Current Outpatient Medications:   •  amLODIPine (NORVASC) 5 MG tablet, TAKE 2 TABLETS BY MOUTH DAILY, Disp: 60 tablet, Rfl: 0  •  atenolol (TENORMIN) 25 MG tablet, Take 1 tablet by mouth Daily., Disp: 30 tablet, Rfl: 11  •  chlorthalidone (HYGROTON) 25 MG tablet, TAKE 1/2 TABLET BY MOUTH DAILY, Disp: 45 tablet, Rfl: 11  •  lisinopril (PRINIVIL,ZESTRIL) 20 MG tablet, Take 1 tablet by mouth Daily., Disp: 30 tablet, Rfl: 11      Objective:     Vitals:    03/26/19 1342 03/26/19 1349   BP: 142/88 146/92   BP Location: Right arm Left arm   Pulse: 79    SpO2: 99%    Weight: 107 kg (235 lb)    Height: 190.5 cm (75\")      Body mass index is " 29.37 kg/m².    PHYSICAL EXAM:    Physical Exam   Constitutional: He is oriented to person, place, and time. He appears well-developed and well-nourished. No distress.   HENT:   Head: Normocephalic and atraumatic.   Eyes: Pupils are equal, round, and reactive to light.   Neck: No JVD present. No thyromegaly present.   Cardiovascular: Normal rate, regular rhythm, normal heart sounds and intact distal pulses.   No murmur heard.  Pulmonary/Chest: Effort normal and breath sounds normal. No respiratory distress.   Abdominal: Soft. Bowel sounds are normal. He exhibits no distension. There is no splenomegaly or hepatomegaly. There is no tenderness.   Musculoskeletal: Normal range of motion. He exhibits no edema.   Neurological: He is alert and oriented to person, place, and time.   Skin: Skin is warm and dry. He is not diaphoretic. No erythema.   Psychiatric: He has a normal mood and affect. His behavior is normal. Judgment normal.         ECG 12 Lead  Date/Time: 3/26/2019 1:57 PM  Performed by: Fadumo Moreno APRN  Authorized by: Fadumo Moreno APRN   Comparison: compared with previous ECG from 2/28/2019  Similar to previous ECG  Rhythm: sinus rhythm  Rate: normal  BPM: 78    Clinical impression: normal ECG  Comments: Indication: Hypertension              Assessment:       Diagnosis Plan   1. Essential hypertension  ECG 12 Lead     Orders Placed This Encounter   Procedures   • ECG 12 Lead     This order was created via procedure documentation          Plan:       1.  Hypertension.  His blood pressure today is reasonable.  He states that his blood pressure is always higher when he is at the doctor.  His blood pressure at home is consistently in the 120s and 130s systolic.  His tachycardia has resolved since starting the atenolol.  His heart rate today is 78 and he reports that at home it is consistently in the 70s and 80s.  I am not going to make any changes to his medical regimen and he will follow-up with   Milind on 3/20/2020 or sooner if needed.      As always, it has been a pleasure to participate in your patient's care.      Sincerely,         ROBIN Singh

## 2019-04-08 RX ORDER — CHLORTHALIDONE 25 MG/1
TABLET ORAL
Qty: 45 TABLET | Refills: 0 | Status: SHIPPED | OUTPATIENT
Start: 2019-04-08 | End: 2020-05-08 | Stop reason: SDUPTHER

## 2019-04-17 RX ORDER — AMLODIPINE BESYLATE 5 MG/1
10 TABLET ORAL DAILY
Qty: 60 TABLET | Refills: 0 | Status: SHIPPED | OUTPATIENT
Start: 2019-04-17 | End: 2019-05-15 | Stop reason: SDUPTHER

## 2019-05-15 RX ORDER — AMLODIPINE BESYLATE 5 MG/1
10 TABLET ORAL DAILY
Qty: 60 TABLET | Refills: 0 | Status: SHIPPED | OUTPATIENT
Start: 2019-05-15 | End: 2020-05-08 | Stop reason: SDUPTHER

## 2019-08-13 RX ORDER — AMLODIPINE BESYLATE 5 MG/1
10 TABLET ORAL DAILY
Qty: 180 TABLET | Refills: 3 | Status: SHIPPED | OUTPATIENT
Start: 2019-08-13 | End: 2020-05-08 | Stop reason: SDUPTHER

## 2020-02-28 RX ORDER — LISINOPRIL 20 MG/1
20 TABLET ORAL DAILY
Qty: 90 TABLET | Refills: 2 | Status: SHIPPED | OUTPATIENT
Start: 2020-02-28 | End: 2020-05-08 | Stop reason: SDUPTHER

## 2020-03-10 RX ORDER — ATENOLOL 25 MG/1
25 TABLET ORAL DAILY
Qty: 90 TABLET | Refills: 2 | Status: SHIPPED | OUTPATIENT
Start: 2020-03-10 | End: 2020-05-08 | Stop reason: SDUPTHER

## 2020-04-02 RX ORDER — CHLORTHALIDONE 25 MG/1
TABLET ORAL
Qty: 45 TABLET | Refills: 11 | Status: SHIPPED | OUTPATIENT
Start: 2020-04-02 | End: 2020-05-08 | Stop reason: SDUPTHER

## 2020-05-08 ENCOUNTER — OFFICE VISIT (OUTPATIENT)
Dept: CARDIOLOGY | Facility: CLINIC | Age: 50
End: 2020-05-08

## 2020-05-08 VITALS
BODY MASS INDEX: 28.72 KG/M2 | WEIGHT: 231 LBS | HEART RATE: 94 BPM | SYSTOLIC BLOOD PRESSURE: 112 MMHG | HEIGHT: 75 IN | DIASTOLIC BLOOD PRESSURE: 84 MMHG

## 2020-05-08 DIAGNOSIS — R00.0 TACHYCARDIA: ICD-10-CM

## 2020-05-08 DIAGNOSIS — I10 ESSENTIAL HYPERTENSION: Primary | ICD-10-CM

## 2020-05-08 PROCEDURE — 99442 PR PHYS/QHP TELEPHONE EVALUATION 11-20 MIN: CPT | Performed by: NURSE PRACTITIONER

## 2020-05-08 RX ORDER — LISINOPRIL 20 MG/1
20 TABLET ORAL DAILY
Qty: 90 TABLET | Refills: 2 | Status: SHIPPED | OUTPATIENT
Start: 2020-05-08 | End: 2021-01-29

## 2020-05-08 RX ORDER — AMLODIPINE BESYLATE 5 MG/1
10 TABLET ORAL DAILY
Qty: 180 TABLET | Refills: 3 | Status: SHIPPED | OUTPATIENT
Start: 2020-05-08 | End: 2021-05-05

## 2020-05-08 RX ORDER — ATENOLOL 25 MG/1
25 TABLET ORAL DAILY
Qty: 90 TABLET | Refills: 2 | Status: SHIPPED | OUTPATIENT
Start: 2020-05-08 | End: 2021-06-01

## 2020-05-08 RX ORDER — CHLORTHALIDONE 25 MG/1
12.5 TABLET ORAL DAILY
Qty: 45 TABLET | Refills: 11 | Status: SHIPPED | OUTPATIENT
Start: 2020-05-08 | End: 2021-06-30 | Stop reason: SDUPTHER

## 2020-05-08 NOTE — PROGRESS NOTES
Date of Office Visit: 2020  Encounter Provider: ROBIN Miller  Place of Service: [facility] CARDIOLOGY  Patient Name: Johnathon Bright  :1970    Chief Complaint   Patient presents with   • Hypertension     1 year f/u    :     HPI: Johnathon Bright is a 49 y.o. male who presents today for follow up of his hypertension and tachycardia. He is a patient of Dr. Vaz and is new to me today. He has been currently working from home 2 days a week and in the office two days a week due to Corona virus pandemic. He works for the [employer]. He has not had any symptoms of chest pain or tachycardia. He has had some issues with sinus tachycardia in the past. 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 His blood pressure has been in the normal range at home.     Past Medical History:   Diagnosis Date   • Allergy    • Arm fracture    • Elevated blood pressure    • Hypertension    • Sinusitis        Past Surgical History:   Procedure Laterality Date   • ARM HARDWARE REMOVAL         Social History     Socioeconomic History   • Marital status:      Spouse name: Not on file   • Number of children: Not on file   • Years of education: Not on file   • Highest education level: Not on file   Tobacco Use   • Smoking status: Former Smoker     Packs/day: 1.00     Last attempt to quit: 2017     Years since quitting: 3.2   • Smokeless tobacco: Current User     Types: Chew   • Tobacco comment: Caffeine - 1   Substance and Sexual Activity   • Alcohol use: Yes     Alcohol/week: 1.0 standard drinks     Types: 1 Shots of liquor per week     Comment: 2 on the weekends   • Drug use: No   • Sexual activity: Yes     Partners: Female       Family History   Problem Relation Age of Onset   • Hypertension Mother    • Sudden death Father    • Hypertension Father    • Heart attack Father    • Arrhythmia Paternal Grandmother    • Obesity Paternal Grandmother   "  • Heart disease Paternal Grandmother    • Pancreatic cancer Paternal Grandfather    • Dementia Paternal Grandfather    • Arrhythmia Sister    • No Known Problems Maternal Grandmother    • No Known Problems Maternal Grandfather        Review of Systems   Constitution: Negative for diaphoresis and malaise/fatigue.   Cardiovascular: Negative for chest pain, claudication, dyspnea on exertion, irregular heartbeat, leg swelling, near-syncope, orthopnea, palpitations, paroxysmal nocturnal dyspnea and syncope.   Respiratory: Negative for cough, shortness of breath and sleep disturbances due to breathing.    Musculoskeletal: Negative for falls.   Neurological: Negative for dizziness and weakness.   Psychiatric/Behavioral: Negative for altered mental status and substance abuse.       No Known Allergies      Current Outpatient Medications:   •  amLODIPine (NORVASC) 5 MG tablet, TAKE 2 TABLETS BY MOUTH DAILY, Disp: 180 tablet, Rfl: 3  •  atenolol (TENORMIN) 25 MG tablet, TAKE 1 TABLET BY MOUTH DAILY, Disp: 90 tablet, Rfl: 2  •  chlorthalidone (HYGROTON) 25 MG tablet, TAKE 1/2 TABLET BY MOUTH DAILY, Disp: 45 tablet, Rfl: 11  •  lisinopril (PRINIVIL,ZESTRIL) 20 MG tablet, TAKE 1 TABLET BY MOUTH DAILY, Disp: 90 tablet, Rfl: 2  •  amLODIPine (NORVASC) 5 MG tablet, TAKE 2 TABLETS BY MOUTH DAILY, Disp: 60 tablet, Rfl: 0      Objective:     Vitals:    05/08/20 1031   BP: 112/84   Pulse: 94   Weight: 105 kg (231 lb)   Height: 190.5 cm (75\")     Body mass index is 28.87 kg/m².    PHYSICAL EXAM:    Physical Exam    Procedures      Assessment:       Diagnosis Plan   1. Essential hypertension     2. Tachycardia       No orders of the defined types were placed in this encounter.         Plan:       I am not making any changes today. His BP is well controlled. I have encouraged him to try to do some exercise a few days a week such as walking in his neighborhood. I have refilled his medications. He can follow up in our office in 1 years " time.     This patient has consented to a telehealth visit via telephone. The visit was scheduled as a telephone visit to comply with patient safety concerns in accordance with CDC recommendations.  All vitals recorded within this visit are reported by the patient.  I spent  15 minutes in total including but not limited to the 11 minutes spent in direct conversation with this patient.        Your medication list           Accurate as of May 8, 2020 10:47 AM. If you have any questions, ask your nurse or doctor.               CONTINUE taking these medications      Instructions Last Dose Given Next Dose Due   amLODIPine 5 MG tablet  Commonly known as:  NORVASC      TAKE 2 TABLETS BY MOUTH DAILY       amLODIPine 5 MG tablet  Commonly known as:  NORVASC      TAKE 2 TABLETS BY MOUTH DAILY       atenolol 25 MG tablet  Commonly known as:  TENORMIN      TAKE 1 TABLET BY MOUTH DAILY       chlorthalidone 25 MG tablet  Commonly known as:  HYGROTON      TAKE 1/2 TABLET BY MOUTH DAILY       lisinopril 20 MG tablet  Commonly known as:  PRINIVIL,ZESTRIL      TAKE 1 TABLET BY MOUTH DAILY                As always, it has been a pleasure to participate in your patient's care.      Sincerely,     [place] KELLY

## 2021-01-13 ENCOUNTER — OFFICE VISIT (OUTPATIENT)
Dept: CARDIOLOGY | Facility: CLINIC | Age: 51
End: 2021-01-13

## 2021-01-13 VITALS
HEART RATE: 96 BPM | WEIGHT: 247.4 LBS | SYSTOLIC BLOOD PRESSURE: 120 MMHG | HEIGHT: 76 IN | DIASTOLIC BLOOD PRESSURE: 80 MMHG | BODY MASS INDEX: 30.13 KG/M2

## 2021-01-13 DIAGNOSIS — I10 ESSENTIAL HYPERTENSION: ICD-10-CM

## 2021-01-13 DIAGNOSIS — E66.09 CLASS 1 OBESITY DUE TO EXCESS CALORIES WITH SERIOUS COMORBIDITY AND BODY MASS INDEX (BMI) OF 30.0 TO 30.9 IN ADULT: Primary | ICD-10-CM

## 2021-01-13 PROBLEM — E66.811 CLASS 1 OBESITY DUE TO EXCESS CALORIES WITH SERIOUS COMORBIDITY AND BODY MASS INDEX (BMI) OF 30.0 TO 30.9 IN ADULT: Status: ACTIVE | Noted: 2021-01-13

## 2021-01-13 PROCEDURE — 99213 OFFICE O/P EST LOW 20 MIN: CPT | Performed by: INTERNAL MEDICINE

## 2021-01-13 PROCEDURE — 93000 ELECTROCARDIOGRAM COMPLETE: CPT | Performed by: INTERNAL MEDICINE

## 2021-01-13 NOTE — PROGRESS NOTES
Date of Office Visit: 21  Encounter Provider: Luke Vaz MD  Place of Service: [facility] CARDIOLOGY  Patient Name: Johnathon Bright  :1970  [id number]    Chief Complaint   Patient presents with   • Hypertension   :     HPI: Johnathon Bright is a 50 y.o. male he has had a history of hypertension that we had seen him for now he is coming back for follow-up.  He is doing well his blood pressures at home are good he has not had any chest pain shortness of breath PND orthopnea edema and his dad had a heart problem is got a cousin with a heart problem next, made him nervous.  He has gained about 15 to 20 pounds during the pandemic    Past Medical History:   Diagnosis Date   • Allergy    • Arm fracture    • Elevated blood pressure    • Hypertension    • Sinusitis        Past Surgical History:   Procedure Laterality Date   • ARM HARDWARE REMOVAL         Social History     Socioeconomic History   • Marital status:      Spouse name: Not on file   • Number of children: Not on file   • Years of education: Not on file   • Highest education level: Not on file   Tobacco Use   • Smoking status: Former Smoker     Packs/day: 1.00     Quit date: 2017     Years since quitting: 3.9   • Smokeless tobacco: Current User     Types: Chew   • Tobacco comment: Caffeine - 1   Substance and Sexual Activity   • Alcohol use: Yes     Alcohol/week: 1.0 standard drinks     Types: 1 Shots of liquor per week     Comment: 2 on the weekends   • Drug use: No   • Sexual activity: Yes     Partners: Female       Family History   Problem Relation Age of Onset   • Hypertension Mother    • Sudden death Father    • Hypertension Father    • Heart attack Father    • Arrhythmia Paternal Grandmother    • Obesity Paternal Grandmother    • Heart disease Paternal Grandmother    • Pancreatic cancer Paternal Grandfather    • Dementia Paternal Grandfather    • Arrhythmia Sister    • No Known Problems Maternal Grandmother  "   • No Known Problems Maternal Grandfather        Review of Systems   Constitution: Negative for decreased appetite, fever, malaise/fatigue and weight loss.   HENT: Negative for nosebleeds.    Eyes: Negative for double vision.   Cardiovascular: Negative for chest pain, claudication, cyanosis, dyspnea on exertion, irregular heartbeat, leg swelling, near-syncope, orthopnea, palpitations, paroxysmal nocturnal dyspnea and syncope.   Respiratory: Negative for cough, hemoptysis and shortness of breath.    Hematologic/Lymphatic: Negative for bleeding problem.   Skin: Negative for rash.   Musculoskeletal: Negative for falls and myalgias.   Gastrointestinal: Negative for hematochezia, jaundice, melena, nausea and vomiting.   Genitourinary: Negative for hematuria.   Neurological: Negative for dizziness and seizures.   Psychiatric/Behavioral: Negative for altered mental status and memory loss.       No Known Allergies      Current Outpatient Medications:   •  amLODIPine (NORVASC) 5 MG tablet, Take 2 tablets by mouth Daily., Disp: 180 tablet, Rfl: 3  •  atenolol (TENORMIN) 25 MG tablet, Take 1 tablet by mouth Daily., Disp: 90 tablet, Rfl: 2  •  chlorthalidone (HYGROTON) 25 MG tablet, Take 0.5 tablets by mouth Daily., Disp: 45 tablet, Rfl: 11  •  lisinopril (PRINIVIL,ZESTRIL) 20 MG tablet, Take 1 tablet by mouth Daily., Disp: 90 tablet, Rfl: 2      Objective:     Vitals:    01/13/21 1324   BP: 120/80   Pulse: 96   Weight: 112 kg (247 lb 6.4 oz)   Height: 193 cm (76\")     Body mass index is 30.11 kg/m².    Constitutional:       Appearance: Well-developed.   Eyes:      General: No scleral icterus.  HENT:      Head: Normocephalic.   Neck:      Thyroid: No thyromegaly.      Vascular: No JVD.      Lymphadenopathy: No cervical adenopathy.   Pulmonary:      Effort: Pulmonary effort is normal.      Breath sounds: Normal breath sounds. No wheezing. No rales.   Cardiovascular:      Normal rate. Regular rhythm.      No gallop.   Edema:    "  Peripheral edema absent.   Abdominal:      Palpations: Abdomen is soft.      Tenderness: There is no abdominal tenderness.   Musculoskeletal: Normal range of motion.   Skin:     General: Skin is warm and dry.      Findings: No rash.   Neurological:      Mental Status: Alert and oriented to person, place, and time.           ECG 12 Lead    Date/Time: 1/13/2021 1:30 PM  Performed by: Luke Vaz MD  Authorized by: Luke Vaz MD   Comparison: compared with previous ECG   Similar to previous ECG  Rhythm: sinus rhythm    Clinical impression: normal ECG             Assessment:       Diagnosis Plan   1. Class 1 obesity due to excess calories with serious comorbidity and body mass index (BMI) of 30.0 to 30.9 in adult     2. Essential hypertension            Plan:       I mean I think from our standpoint he is doing really well he has no symptoms blood pressures well controlled okay with him his risk modification as well I told him to try and get his weight down watch his sodium intake keep being active does not have diabetes does not smoke so those are both good I think he can just come back and see us as needed Bettie Daniels is done a fantastic job with him    As always, it has been a pleasure to participate in your patient's care.      Sincerely,       Luke Vaz MD

## 2021-01-29 RX ORDER — LISINOPRIL 20 MG/1
20 TABLET ORAL DAILY
Qty: 90 TABLET | Refills: 2 | Status: SHIPPED | OUTPATIENT
Start: 2021-01-29 | End: 2021-11-01

## 2021-05-05 RX ORDER — AMLODIPINE BESYLATE 5 MG/1
10 TABLET ORAL DAILY
Qty: 180 TABLET | Refills: 3 | Status: SHIPPED | OUTPATIENT
Start: 2021-05-05 | End: 2022-04-29 | Stop reason: SDUPTHER

## 2021-06-01 RX ORDER — ATENOLOL 25 MG/1
25 TABLET ORAL DAILY
Qty: 90 TABLET | Refills: 2 | Status: SHIPPED | OUTPATIENT
Start: 2021-06-01 | End: 2022-02-28

## 2021-06-30 RX ORDER — CHLORTHALIDONE 25 MG/1
12.5 TABLET ORAL DAILY
Qty: 45 TABLET | Refills: 11 | Status: SHIPPED | OUTPATIENT
Start: 2021-06-30 | End: 2022-09-19 | Stop reason: SDUPTHER

## 2021-09-15 ENCOUNTER — OFFICE VISIT (OUTPATIENT)
Dept: FAMILY MEDICINE CLINIC | Age: 51
End: 2021-09-15

## 2021-09-15 VITALS
HEART RATE: 83 BPM | DIASTOLIC BLOOD PRESSURE: 61 MMHG | BODY MASS INDEX: 28.12 KG/M2 | SYSTOLIC BLOOD PRESSURE: 122 MMHG | WEIGHT: 231 LBS

## 2021-09-15 DIAGNOSIS — N52.8 OTHER MALE ERECTILE DYSFUNCTION: ICD-10-CM

## 2021-09-15 DIAGNOSIS — R53.83 OTHER FATIGUE: ICD-10-CM

## 2021-09-15 DIAGNOSIS — I10 ESSENTIAL HYPERTENSION: Primary | ICD-10-CM

## 2021-09-15 DIAGNOSIS — R06.83 SNORING: ICD-10-CM

## 2021-09-15 PROCEDURE — 99203 OFFICE O/P NEW LOW 30 MIN: CPT | Performed by: NURSE PRACTITIONER

## 2021-09-15 RX ORDER — TADALAFIL 20 MG/1
20 TABLET ORAL DAILY PRN
Qty: 9 TABLET | Refills: 1 | Status: SHIPPED | OUTPATIENT
Start: 2021-09-15 | End: 2021-09-21 | Stop reason: SDUPTHER

## 2021-09-15 NOTE — ASSESSMENT & PLAN NOTE
Reviewed cardiology note from 1-2021  Hypertension is stable. Continue to monitor BP at home. Continue current meds. Continue to modify diet and lifestyle. Will need labs every 6 months and follow up.

## 2021-09-15 NOTE — PROGRESS NOTES
Johnathon Bright presents to [facility].    Chief Complaint:  Hypertension (check up )         History of Present Illness:  Hypertension:  Current medication norvasc, lisinopril, diuretic, atenolol   Tolerating Medication: Yes  Walks regularly   Checking BP at home and it is 117/72  Needs refills: No, but will in the future   Labs   Lab Results       Component                Value               Date                       GLUCOSE                  122 (H)             02/28/2019                 BUN                      13                  02/28/2019                 CREATININE               1.12                02/28/2019                 EGFRIFNONA               71                  02/28/2019                 BCR                      11.6                02/28/2019                 K                        4.4                 02/28/2019                 CO2                      27.0                02/28/2019                 CALCIUM                  10.1                02/28/2019                 ALBUMIN                  4.60                02/28/2019                 AST                      18                  02/28/2019                 ALT                      34                  02/28/2019              Listed as a pt of cherry thomas's / she saw him once in 2017, then I saw him again after that X 1 in 2017  He is seen by [facility] cardiology/Milind and he told him just to follow up here.  He had done some cardiac testing on him.  He has quit smoking and lost weight and exercises regularly.     PMH changes since 2017: no surgeries or hospitalizations     Social  Manager of farm services in [place]    2 children     ED since being on rx for BP, difficulty maintaining or getting adequate erection, wants to try cialis, said Dr Vaz was okay with this.       Review of Systems:  Review of Systems   Constitutional: Positive for fatigue. Negative for fever.   Respiratory: Negative  for cough and shortness of breath.         Snores, had a home sleep study of few weeks ago    Cardiovascular: Negative for chest pain, palpitations and leg swelling.   Neurological: Negative for numbness.          Vital Signs:   /61 (BP Location: Left arm, Patient Position: Sitting)   Pulse 83   Wt 105 kg (231 lb)   BMI 28.12 kg/m²       Physical Exam:  Physical Exam  Vitals reviewed.   Constitutional:       General: He is not in acute distress.     Appearance: Normal appearance.   Neck:      Vascular: No carotid bruit.   Cardiovascular:      Rate and Rhythm: Normal rate and regular rhythm.      Heart sounds: Normal heart sounds. No murmur heard.     Pulmonary:      Effort: Pulmonary effort is normal. No respiratory distress.      Breath sounds: Normal breath sounds.   Musculoskeletal:      Right lower leg: No edema.      Left lower leg: No edema.   Neurological:      Mental Status: He is alert.   Psychiatric:         Mood and Affect: Mood normal.         Behavior: Behavior normal.         Result Review      The following data was reviewed by: ROBIN Burgos on 09/15/2021:    Results for orders placed or performed during the hospital encounter of 03/08/19   Adult Transthoracic Echo Complete W/ Cont if Necessary Per Protocol   Result Value Ref Range    BSA 2.4 m^2    IVSd 1.0 cm    LVIDd 5.7 cm    LVIDs 3.9 cm    LVPWd 1.0 cm    IVS/LVPW 1.0     FS 31.4 %    EDV(Teich) 159.5 ml    ESV(Teich) 66.0 ml    EF(Teich) 58.6 %    EF(cubed) 67.8 %    LV mass(C)d 231.7 grams    LV mass(C)dI 98.1 grams/m^2    SV(Teich) 93.5 ml    SI(Teich) 39.6 ml/m^2    SV(cubed) 125.0 ml    SI(cubed) 52.9 ml/m^2    Ao root diam 3.6 cm    Ao root area 10.2 cm^2    ACS 2.1 cm    LVOT diam 2.0 cm    LVOT area 3.1 cm^2    LVOT area(traced) 3.1 cm^2    RVOT diam 1.8 cm    RVOT area 2.7 cm^2    LVLd ap4 7.3 cm    EDV(MOD-sp4) 105.0 ml    LVLs ap4 6.3 cm    ESV(MOD-sp4) 46.0 ml    EF(MOD-sp4) 56.2 %    LVLd ap2 6.2 cm     EDV(MOD-sp2) 70.0 ml    LVLs ap2 6.3 cm    ESV(MOD-sp2) 30.0 ml    EF(MOD-sp2) 57.1 %    SV(MOD-sp4) 59.0 ml    SI(MOD-sp4) 25.0 ml/m^2    SV(MOD-sp2) 40.0 ml    SI(MOD-sp2) 16.9 ml/m^2    Ao root area (BSA corrected) 1.5     LV Jorge Vol (BSA corrected) 44.4 ml/m^2    LV Sys Vol (BSA corrected) 19.5 ml/m^2    MV A dur 0.08 sec    MV E max flaquito 62.1 cm/sec    MV A max flaquito 100.4 cm/sec    MV E/A 0.62     MV V2 max 112.9 cm/sec    MV max PG 5.1 mmHg    MV V2 mean 82.7 cm/sec    MV mean PG 3.0 mmHg    MV V2 VTI 18.9 cm    MVA(VTI) 3.5 cm^2    MV P1/2t max flaquito 63.1 cm/sec    MV P1/2t 36.5 msec    MVA(P1/2t) 6.0 cm^2    MV dec slope 506.5 cm/sec^2    MV dec time 0.12 sec    Ao pk flaquito 175.9 cm/sec    Ao max PG 12.4 mmHg    Ao max PG (full) 2.3 mmHg    Ao V2 mean 110.8 cm/sec    Ao mean PG 5.8 mmHg    Ao mean PG (full) 0.1 mmHg    Ao V2 VTI 22.8 cm    LEXY(I,A) 2.9 cm^2    LEXY(I,D) 2.9 cm^2    LEXY(V,A) 2.8 cm^2    LEXY(V,D) 2.8 cm^2    LV V1 max PG 10.1 mmHg    LV V1 mean PG 5.7 mmHg    LV V1 max 159.1 cm/sec    LV V1 mean 113.9 cm/sec    LV V1 VTI 21.1 cm    SV(Ao) 232.2 ml    SI(Ao) 98.3 ml/m^2    SV(LVOT) 65.9 ml    SV(RVOT) 32.5 ml    SI(LVOT) 27.9 ml/m^2    PA V2 max 180.3 cm/sec    PA max PG 13.0 mmHg    PA max PG (full) 9.8 mmHg    BH CV ECHO MARIA ESTHER - PVA(V,A) 1.3 cm^2     CV ECHO MARIA ESTHER - PVA(V,D) 1.3 cm^2    PA acc time 0.11 sec    RV V1 max PG 3.2 mmHg    RV V1 mean PG 1.6 mmHg    RV V1 max 88.8 cm/sec    RV V1 mean 57.0 cm/sec    RV V1 VTI 12.2 cm    PA pr(Accel) 31.5 mmHg    Pulm Sys Flaquito 77.1 cm/sec    Pulm Jorge Flaquito 50.9 cm/sec    Pulm S/D 1.5     Qp/Qs 0.49     Pulm A Revs Dur 0.1 sec    Pulm A Revs Flaquito 42.2 cm/sec    MVA P1/2T LCG 3.5 cm^2     CV ECHO MARIA ESTHER - BZI_BMI 29.7 kilograms/m^2     CV ECHO MARIA ESTHER - BSA(HAYCOCK) 2.4 m^2     CV ECHO MARIA ESTHER - BZI_METRIC_WEIGHT 108.0 kg     CV ECHO MARIA ESTHER - BZI_METRIC_HEIGHT 190.5 cm    Target HR (85%) 148 bpm    Max. Pred. HR (100%) 174 bpm    RV S' 18.00 cm/sec    RV Base  3.40 cm    Sinus 3.00 cm    STJ 2.50 cm    Ascending aorta 2.60 cm    LA Volume Index 19.0 mL/m2    Avg E/e' ratio 7.31     EF(MOD-bp) 57 %    Lat Peak E' Flaquito 9.0 cm/sec    Med Peak E' Flaquito 8.00 cm/sec    Abdo Ao Diam 2.10 cm    TAPSE (>1.6) 1.80 cm2    Echo EF Estimated 57 %               Assessment and Plan:          Diagnoses and all orders for this visit:    1. Essential hypertension (Primary)  Assessment & Plan:  Reviewed cardiology note from 1-2021  Hypertension is stable. Continue to monitor BP at home. Continue current meds. Continue to modify diet and lifestyle. Will need labs every 6 months and follow up.       Orders:  -     Comprehensive Metabolic Panel  -     Lipid Panel  -     Ambulatory Referral to Sleep Medicine    2. Snoring  -     Ambulatory Referral to Sleep Medicine    3. Other fatigue  Assessment & Plan:  Set up sleep study     Orders:  -     Ambulatory Referral to Sleep Medicine  -     CBC & Differential; Future  -     TSH; Future  -     Testosterone, Free, Total; Future    4. Other male erectile dysfunction  Assessment & Plan:  Trial of cialis, risk vs benefit discussed     Orders:  -     tadalafil (Cialis) 20 MG tablet; Take 1 tablet by mouth Daily As Needed for Erectile Dysfunction.  Dispense: 9 tablet; Refill: 1  -     Testosterone, Free, Total; Future        Follow Up   Return for he will return fasting for labs .  Patient was given instructions and counseling regarding his condition or for health maintenance advice. Please see specific information pulled into the AVS if appropriate.

## 2021-09-16 ENCOUNTER — TELEPHONE (OUTPATIENT)
Dept: FAMILY MEDICINE CLINIC | Age: 51
End: 2021-09-16

## 2021-09-16 ENCOUNTER — LAB (OUTPATIENT)
Dept: LAB | Facility: HOSPITAL | Age: 51
End: 2021-09-16

## 2021-09-16 DIAGNOSIS — N52.8 OTHER MALE ERECTILE DYSFUNCTION: ICD-10-CM

## 2021-09-16 DIAGNOSIS — R53.83 OTHER FATIGUE: ICD-10-CM

## 2021-09-16 LAB
ALBUMIN SERPL-MCNC: 4.7 G/DL (ref 3.5–5.2)
ALBUMIN/GLOB SERPL: 1.6 G/DL
ALP SERPL-CCNC: 59 U/L (ref 39–117)
ALT SERPL W P-5'-P-CCNC: 32 U/L (ref 1–41)
ANION GAP SERPL CALCULATED.3IONS-SCNC: 12.7 MMOL/L (ref 5–15)
AST SERPL-CCNC: 23 U/L (ref 1–40)
BASOPHILS # BLD AUTO: 0.07 10*3/MM3 (ref 0–0.2)
BASOPHILS NFR BLD AUTO: 0.9 % (ref 0–1.5)
BILIRUB SERPL-MCNC: 0.4 MG/DL (ref 0–1.2)
BUN SERPL-MCNC: 27 MG/DL (ref 6–20)
BUN/CREAT SERPL: 18.4 (ref 7–25)
CALCIUM SPEC-SCNC: 9.9 MG/DL (ref 8.6–10.5)
CHLORIDE SERPL-SCNC: 100 MMOL/L (ref 98–107)
CHOLEST SERPL-MCNC: 168 MG/DL (ref 0–200)
CO2 SERPL-SCNC: 25.3 MMOL/L (ref 22–29)
CREAT SERPL-MCNC: 1.47 MG/DL (ref 0.76–1.27)
DEPRECATED RDW RBC AUTO: 41.8 FL (ref 37–54)
EOSINOPHIL # BLD AUTO: 0.15 10*3/MM3 (ref 0–0.4)
EOSINOPHIL NFR BLD AUTO: 1.9 % (ref 0.3–6.2)
ERYTHROCYTE [DISTWIDTH] IN BLOOD BY AUTOMATED COUNT: 12.8 % (ref 12.3–15.4)
GFR SERPL CREATININE-BSD FRML MDRD: 51 ML/MIN/1.73
GLOBULIN UR ELPH-MCNC: 2.9 GM/DL
GLUCOSE SERPL-MCNC: 106 MG/DL (ref 65–99)
HCT VFR BLD AUTO: 43.2 % (ref 37.5–51)
HDLC SERPL-MCNC: 40 MG/DL (ref 40–60)
HGB BLD-MCNC: 14.7 G/DL (ref 13–17.7)
IMM GRANULOCYTES # BLD AUTO: 0.01 10*3/MM3 (ref 0–0.05)
IMM GRANULOCYTES NFR BLD AUTO: 0.1 % (ref 0–0.5)
LDLC SERPL CALC-MCNC: 108 MG/DL (ref 0–100)
LDLC/HDLC SERPL: 2.67 {RATIO}
LYMPHOCYTES # BLD AUTO: 3.59 10*3/MM3 (ref 0.7–3.1)
LYMPHOCYTES NFR BLD AUTO: 45.3 % (ref 19.6–45.3)
MCH RBC QN AUTO: 29.9 PG (ref 26.6–33)
MCHC RBC AUTO-ENTMCNC: 34 G/DL (ref 31.5–35.7)
MCV RBC AUTO: 88 FL (ref 79–97)
MONOCYTES # BLD AUTO: 0.95 10*3/MM3 (ref 0.1–0.9)
MONOCYTES NFR BLD AUTO: 12 % (ref 5–12)
NEUTROPHILS NFR BLD AUTO: 3.16 10*3/MM3 (ref 1.7–7)
NEUTROPHILS NFR BLD AUTO: 39.8 % (ref 42.7–76)
PLATELET # BLD AUTO: 317 10*3/MM3 (ref 140–450)
PMV BLD AUTO: 8.5 FL (ref 6–12)
POTASSIUM SERPL-SCNC: 4.3 MMOL/L (ref 3.5–5.2)
PROT SERPL-MCNC: 7.6 G/DL (ref 6–8.5)
RBC # BLD AUTO: 4.91 10*6/MM3 (ref 4.14–5.8)
SODIUM SERPL-SCNC: 138 MMOL/L (ref 136–145)
TRIGL SERPL-MCNC: 107 MG/DL (ref 0–150)
TSH SERPL DL<=0.05 MIU/L-ACNC: 1.37 UIU/ML (ref 0.27–4.2)
VLDLC SERPL-MCNC: 20 MG/DL (ref 5–40)
WBC # BLD AUTO: 7.93 10*3/MM3 (ref 3.4–10.8)

## 2021-09-16 PROCEDURE — 84402 ASSAY OF FREE TESTOSTERONE: CPT

## 2021-09-16 PROCEDURE — 84403 ASSAY OF TOTAL TESTOSTERONE: CPT

## 2021-09-16 PROCEDURE — 80053 COMPREHEN METABOLIC PANEL: CPT | Performed by: NURSE PRACTITIONER

## 2021-09-16 PROCEDURE — 80061 LIPID PANEL: CPT | Performed by: NURSE PRACTITIONER

## 2021-09-16 PROCEDURE — 36415 COLL VENOUS BLD VENIPUNCTURE: CPT | Performed by: NURSE PRACTITIONER

## 2021-09-16 PROCEDURE — 85025 COMPLETE CBC W/AUTO DIFF WBC: CPT

## 2021-09-16 PROCEDURE — 84443 ASSAY THYROID STIM HORMONE: CPT

## 2021-09-16 NOTE — TELEPHONE ENCOUNTER
Caller: Johnathon Bright    Relationship: Self    Best call back number: [phone]    What is the best time to reach you: ANYTIME    Who are you requesting to speak with (clinical staff, provider,  specific staff member): TIESHA GREEN    Do you know the name of the person who called:     What was the call regarding: PATIENT STATES THAT THIS MEDICATION IS NEEDING A PA. tadalafil (Cialis) 20 MG tablet.   PATIENT STATES THAT THIS IS A TIER 4 DRUG AND IS COMING FROM [facility] BUT FROM A SPECIAL PROGRAM. PATIENT STATES THAT A PAPER WAS ALREADY SUPPOSED TO BE SENT FOR THE PROVIDER TO FILL OUT.   [phone]    Do you require a callback: NO

## 2021-09-20 NOTE — TELEPHONE ENCOUNTER
Caller: Johnathon Bright    Relationship: Self    Best call back number: [phone]   Medication needed:   Requested Prescriptions      No prescriptions requested or ordered in this encounter     tadalafil (Cialis) 20 MG tablet  When do you need the refill by:     What additional details did the patient provide when requesting the medication: PATIENT WOULD LIKE THIS MEDICATION SENT TO [facility]    BECAUSE IT IS CHEAPER THAN [facility]   Does the patient have less than a 3 day supply:  [x] Yes  [] No    What is the patient's preferred pharmacy: [address] - [phone]  - [phone]

## 2021-09-21 LAB
TESTOST FREE SERPL-MCNC: 23.3 PG/ML (ref 6.8–21.5)
TESTOST SERPL-MCNC: 367 NG/DL (ref 264–916)

## 2021-09-21 NOTE — TELEPHONE ENCOUNTER
Pt didn't  the Cialis 20mg at [facility] in [place]. Confirmed with pharmacy.  [facility] [place] because he said its cheaper.

## 2021-09-24 RX ORDER — TADALAFIL 20 MG/1
20 TABLET ORAL DAILY PRN
Qty: 9 TABLET | Refills: 1 | Status: SHIPPED | OUTPATIENT
Start: 2021-09-24 | End: 2021-12-28 | Stop reason: SDUPTHER

## 2021-11-01 RX ORDER — LISINOPRIL 20 MG/1
20 TABLET ORAL DAILY
Qty: 90 TABLET | Refills: 2 | Status: SHIPPED | OUTPATIENT
Start: 2021-11-01 | End: 2022-08-07 | Stop reason: SDUPTHER

## 2021-12-28 DIAGNOSIS — N52.8 OTHER MALE ERECTILE DYSFUNCTION: ICD-10-CM

## 2021-12-29 RX ORDER — TADALAFIL 20 MG/1
20 TABLET ORAL DAILY PRN
Qty: 9 TABLET | Refills: 1 | Status: SHIPPED | OUTPATIENT
Start: 2021-12-29

## 2022-02-28 RX ORDER — ATENOLOL 25 MG/1
25 TABLET ORAL DAILY
Qty: 90 TABLET | Refills: 2 | Status: SHIPPED | OUTPATIENT
Start: 2022-02-28

## 2022-04-29 RX ORDER — AMLODIPINE BESYLATE 5 MG/1
10 TABLET ORAL DAILY
Qty: 180 TABLET | Refills: 3 | Status: SHIPPED | OUTPATIENT
Start: 2022-04-29

## 2022-08-08 RX ORDER — LISINOPRIL 20 MG/1
20 TABLET ORAL DAILY
Qty: 90 TABLET | Refills: 2 | Status: SHIPPED | OUTPATIENT
Start: 2022-08-08

## 2022-09-20 ENCOUNTER — TELEPHONE (OUTPATIENT)
Dept: CARDIOLOGY | Facility: CLINIC | Age: 52
End: 2022-09-20

## 2022-09-20 DIAGNOSIS — I10 ESSENTIAL HYPERTENSION: Primary | ICD-10-CM

## 2022-09-20 RX ORDER — CHLORTHALIDONE 25 MG/1
12.5 TABLET ORAL DAILY
Qty: 45 TABLET | Refills: 1 | Status: SHIPPED | OUTPATIENT
Start: 2022-09-20 | End: 2023-03-20

## 2022-09-20 NOTE — TELEPHONE ENCOUNTER
Let patient know I will give a 1 month supply but he needs labs. I will send more refills once labs are done.

## 2022-11-28 RX ORDER — ATENOLOL 25 MG/1
25 TABLET ORAL DAILY
Qty: 90 TABLET | Refills: 2 | OUTPATIENT
Start: 2022-11-28

## 2023-03-20 RX ORDER — CHLORTHALIDONE 25 MG/1
TABLET ORAL
Qty: 45 TABLET | Refills: 11 | Status: SHIPPED | OUTPATIENT
Start: 2023-03-20

## 2023-03-20 RX ORDER — CHLORTHALIDONE 25 MG/1
TABLET ORAL
Qty: 15 TABLET | Refills: 0 | Status: SHIPPED | OUTPATIENT
Start: 2023-03-20 | End: 2023-03-20

## 2023-03-20 NOTE — TELEPHONE ENCOUNTER
Left vm for pt wife and also gave ot a call no answer. No more refills untill appt is made.    Lov 1.13.21

## 2023-04-26 RX ORDER — AMLODIPINE BESYLATE 5 MG/1
10 TABLET ORAL DAILY
Qty: 180 TABLET | Refills: 3 | OUTPATIENT
Start: 2023-04-26

## 2023-04-26 NOTE — TELEPHONE ENCOUNTER
Let patient know that he needs to have an appointment for refill.  He has not been seen in almost 2 years.

## 2023-04-28 ENCOUNTER — TELEPHONE (OUTPATIENT)
Dept: CARDIOLOGY | Facility: CLINIC | Age: 53
End: 2023-04-28
Payer: COMMERCIAL

## 2023-04-28 RX ORDER — AMLODIPINE BESYLATE 5 MG/1
10 TABLET ORAL DAILY
Qty: 180 TABLET | Refills: 3 | OUTPATIENT
Start: 2023-04-28

## 2023-04-28 RX ORDER — ATENOLOL 25 MG/1
25 TABLET ORAL DAILY
Qty: 90 TABLET | Refills: 2 | OUTPATIENT
Start: 2023-04-28

## 2023-04-28 NOTE — TELEPHONE ENCOUNTER
Georgia Kowalski APRN routed conversation to You 2 days ago     Georgia Kowalski APRN 2 days ago     LR  Let patient know that he needs to have an appointment for refill.  He has not been seen in almost 2 years.         Note          You routed conversation to Georgia Kowalski APRN 2 days ago     You 2 days ago       Lov 1.13.21         Note

## 2023-04-28 NOTE — TELEPHONE ENCOUNTER
Caller: Johnathon Bright    Relationship: Self    Best call back number: [phone]     Requested Prescriptions:   Requested Prescriptions     Pending Prescriptions Disp Refills   • amLODIPine (NORVASC) 5 MG tablet 180 tablet 3     Sig: Take 2 tablets by mouth Daily.   • atenolol (TENORMIN) 25 MG tablet 90 tablet 2     Sig: Take 1 tablet by mouth Daily.        Pharmacy where request should be sent: [facility] #[id number] [address] AT [place] [address] - [phone] [facility] [phone] FX     Last office visit with prescribing clinician: 9/15/2021   Last telemedicine visit with prescribing clinician: Visit date not found   Next office visit with prescribing clinician: Visit date not found     Additional details provided by patient: COMPLETELY OUT    Does the patient have less than a 3 day supply:  [x] Yes  [] No    Would you like a call back once the refill request has been completed: [] Yes [x] No    If the office needs to give you a call back, can they leave a voicemail: [] Yes [] No    Sakshi Frias Rep   04/28/23 13:34 EDT

## 2023-04-28 NOTE — TELEPHONE ENCOUNTER
Rx Refill Note  Requested Prescriptions     Pending Prescriptions Disp Refills   • amLODIPine (NORVASC) 5 MG tablet 180 tablet 3     Sig: Take 2 tablets by mouth Daily.   • atenolol (TENORMIN) 25 MG tablet 90 tablet 2     Sig: Take 1 tablet by mouth Daily.      Last office visit with prescribing clinician: 9/15/2021   Last telemedicine visit with prescribing clinician: Visit date not found   Next office visit with prescribing clinician: Visit date not found     Denied,  Not seen here since 09/15/2021.       Elizabeth Mason LPN  04/28/23, 14:05 EDT

## 2023-05-04 RX ORDER — LISINOPRIL 20 MG/1
20 TABLET ORAL DAILY
Qty: 90 TABLET | Refills: 2 | Status: SHIPPED | OUTPATIENT
Start: 2023-05-04

## 2024-01-19 DIAGNOSIS — I10 ESSENTIAL HYPERTENSION: Primary | ICD-10-CM

## 2024-01-19 RX ORDER — LISINOPRIL 20 MG/1
20 TABLET ORAL DAILY
Qty: 90 TABLET | Refills: 2 | OUTPATIENT
Start: 2024-01-19

## 2024-01-19 RX ORDER — ATENOLOL 25 MG/1
25 TABLET ORAL DAILY
Qty: 90 TABLET | Refills: 2 | OUTPATIENT
Start: 2024-01-19

## 2024-01-19 RX ORDER — AMLODIPINE BESYLATE 5 MG/1
10 TABLET ORAL DAILY
Qty: 180 TABLET | Refills: 3 | OUTPATIENT
Start: 2024-01-19

## 2024-01-19 NOTE — TELEPHONE ENCOUNTER
Caller: DeangeloJohnathon    Relationship: Self    Best call back number: [phone]     Requested Prescriptions:   Requested Prescriptions     Pending Prescriptions Disp Refills    lisinopril (PRINIVIL,ZESTRIL) 20 MG tablet 90 tablet 2     Sig: Take 1 tablet by mouth Daily.    atenolol (TENORMIN) 25 MG tablet 90 tablet 2     Sig: Take 1 tablet by mouth Daily.    amLODIPine (NORVASC) 5 MG tablet 180 tablet 3     Sig: Take 2 tablets by mouth Daily.        Pharmacy where request should be sent: [id number] [address] AT [facility] [address] [place] [address] & [place] [address] - [phone]  - [phone] FX     Last office visit with prescribing clinician: Visit date not found   Last telemedicine visit with prescribing clinician: Visit date not found   Next office visit with prescribing clinician: Visit date not found     Does the patient have less than a 3 day supply:  [x] Yes  [] No    Sakshi Hernandez Rep   01/19/24 14:33 EST

## 2024-01-22 RX ORDER — AMLODIPINE BESYLATE 5 MG/1
10 TABLET ORAL DAILY
Qty: 14 TABLET | Refills: 0 | Status: SHIPPED | OUTPATIENT
Start: 2024-01-22

## 2024-01-22 RX ORDER — ATENOLOL 25 MG/1
25 TABLET ORAL DAILY
Qty: 7 TABLET | Refills: 0 | Status: SHIPPED | OUTPATIENT
Start: 2024-01-22

## 2024-01-22 RX ORDER — LISINOPRIL 20 MG/1
20 TABLET ORAL DAILY
Qty: 7 TABLET | Refills: 0 | Status: SHIPPED | OUTPATIENT
Start: 2024-01-22

## 2024-01-22 NOTE — TELEPHONE ENCOUNTER
Pt said he is needing lisinopril 20mg one daily, Amlodipine 5mg two daily and atenolol 25mg daily.   He said the cardiologist released him back to his pcp, he no longer sees them.   One week sent to pharmacy.

## 2024-01-22 NOTE — TELEPHONE ENCOUNTER
Pt called and said he needs his b/p medication refilled.  Explained to pt he has not been seen in this office since 2021.  We can not refill his meds.  [place] [facility] has been refilling them.  He said he can not do with out them.  He scheduled an appt for 01/29/24 with A Frances and he is asking for her to give him at least 7 days.

## 2024-01-22 NOTE — TELEPHONE ENCOUNTER
I looked at his chart, he was seeing RAD[facility] Maradiaga, I saw him couple of times looks like and then he has been seeing cardiology, why is he not going back there

## 2024-01-27 DIAGNOSIS — I10 ESSENTIAL HYPERTENSION: ICD-10-CM

## 2024-01-29 ENCOUNTER — OFFICE VISIT (OUTPATIENT)
Dept: FAMILY MEDICINE CLINIC | Age: 54
End: 2024-01-29
Payer: COMMERCIAL

## 2024-01-29 VITALS
HEART RATE: 77 BPM | HEIGHT: 76 IN | WEIGHT: 255.4 LBS | SYSTOLIC BLOOD PRESSURE: 125 MMHG | BODY MASS INDEX: 31.1 KG/M2 | TEMPERATURE: 98.2 F | DIASTOLIC BLOOD PRESSURE: 82 MMHG

## 2024-01-29 DIAGNOSIS — Z12.11 SCREEN FOR COLON CANCER: ICD-10-CM

## 2024-01-29 DIAGNOSIS — I10 ESSENTIAL HYPERTENSION: ICD-10-CM

## 2024-01-29 DIAGNOSIS — I10 ESSENTIAL HYPERTENSION: Primary | ICD-10-CM

## 2024-01-29 DIAGNOSIS — Z11.59 SCREENING FOR VIRAL DISEASE: ICD-10-CM

## 2024-01-29 PROCEDURE — 99214 OFFICE O/P EST MOD 30 MIN: CPT | Performed by: NURSE PRACTITIONER

## 2024-01-29 RX ORDER — LISINOPRIL 20 MG/1
20 TABLET ORAL DAILY
Qty: 90 TABLET | OUTPATIENT
Start: 2024-01-29

## 2024-01-29 RX ORDER — LISINOPRIL 20 MG/1
20 TABLET ORAL DAILY
Qty: 90 TABLET | Refills: 0 | Status: SHIPPED | OUTPATIENT
Start: 2024-01-29

## 2024-01-29 RX ORDER — AMLODIPINE BESYLATE 5 MG/1
10 TABLET ORAL DAILY
Qty: 180 TABLET | Refills: 0 | Status: SHIPPED | OUTPATIENT
Start: 2024-01-29

## 2024-01-29 RX ORDER — ATENOLOL 25 MG/1
25 TABLET ORAL DAILY
Qty: 7 TABLET | Refills: 0 | OUTPATIENT
Start: 2024-01-29

## 2024-01-29 RX ORDER — LISINOPRIL 20 MG/1
20 TABLET ORAL DAILY
Qty: 7 TABLET | Refills: 0 | OUTPATIENT
Start: 2024-01-29

## 2024-01-29 RX ORDER — AMLODIPINE BESYLATE 5 MG/1
10 TABLET ORAL DAILY
Qty: 14 TABLET | Refills: 0 | OUTPATIENT
Start: 2024-01-29

## 2024-01-29 RX ORDER — ATENOLOL 25 MG/1
25 TABLET ORAL DAILY
Qty: 90 TABLET | Refills: 0 | Status: SHIPPED | OUTPATIENT
Start: 2024-01-29

## 2024-01-29 RX ORDER — CHLORTHALIDONE 25 MG/1
12.5 TABLET ORAL DAILY
Qty: 45 TABLET | Refills: 1 | Status: SHIPPED | OUTPATIENT
Start: 2024-01-29

## 2024-01-29 NOTE — PROGRESS NOTES
Chief Complaint  Hypertension (Follow up for HTN.)    Subjective          Johnathon Bright presents to [facility]    History of Present Illness  Hypertension:  Current medication: Norvasc, tenormin,lisinopril, Hygroton  Tolerating Medication: Yes  Checking BP at home and it is: 117/78, checks monthly runs like it is here today or less  Needs refills: Yes needs all bp rx refills sent to [facility]  Labs:  Lab Results       Component                Value               Date                       GLUCOSE                  106 (H)             2021                 BUN                      27 (H)              2021                 CREATININE               1.47 (H)            2021                 EGFRIFNONA               51 (L)              2021                 BCR                      18.4                2021                 K                        4.3                 2021                 CO2                      25.3                2021                 CALCIUM                  9.9                 2021                 ALBUMIN                  4.70                2021                 AST                      23                  2021                 ALT                      32                  2021               PMH changes since :     HTN  JASMYN  Sees [facility] Dr Vaz and the NP    Surgery:    Right forearm fracture as senior and then removed Hardware( ,  removal)     Family history:    Mother HTN  Father : CAD,  age 66]  Sister: 1  Daughters: 2, healthy    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[place]    2 children                    Past Medical History:   Diagnosis Date    Allergy     Arm fracture     Elevated blood pressure     Hypertension     Sinusitis        No Known Allergies     Past Surgical History:   Procedure Laterality Date    ARM HARDWARE  REMOVAL          Social History     Tobacco Use    Smoking status: Former     Packs/day: 1     Types: Cigarettes     Quit date: 2017     Years since quittin.9     Passive exposure: Past    Smokeless tobacco: Current     Types: Chew    Tobacco comments:     Caffeine - 1   Substance Use Topics    Alcohol use: Yes     Alcohol/week: 1.0 standard drink of alcohol     Types: 1 Shots of liquor per week     Comment: 2 on the weekends       Family History   Problem Relation Age of Onset    Hypertension Mother     Sudden death Father     Hypertension Father     Heart attack Father     Arrhythmia Paternal Grandmother     Obesity Paternal Grandmother     Heart disease Paternal Grandmother     Pancreatic cancer Paternal Grandfather     Dementia Paternal Grandfather     Arrhythmia Sister     No Known Problems Maternal Grandmother     No Known Problems Maternal Grandfather         Health Maintenance Due   Topic Date Due    COLORECTAL CANCER SCREENING  Never done    HEPATITIS C SCREENING  Never done    ANNUAL PHYSICAL  Never done    ZOSTER VACCINE (1 of 2) Never done        Current Outpatient Medications on File Prior to Visit   Medication Sig    tadalafil (Cialis) 20 MG tablet Take 1 tablet by mouth Daily As Needed for Erectile Dysfunction.    [DISCONTINUED] amLODIPine (NORVASC) 5 MG tablet Take 2 tablets by mouth Daily.    [DISCONTINUED] atenolol (TENORMIN) 25 MG tablet Take 1 tablet by mouth Daily.    [DISCONTINUED] chlorthalidone (HYGROTON) 25 MG tablet TAKE 1/2 TABLET BY MOUTH DAILY    [DISCONTINUED] lisinopril (PRINIVIL,ZESTRIL) 20 MG tablet Take 1 tablet by mouth Daily.     No current facility-administered medications on file prior to visit.       Immunization History   Administered Date(s) Administered    COVID-19 (MODERNA) 1st,2nd,3rd Dose Monovalent 2021, 2021    Tdap 2017       Review of Systems   Constitutional:  Negative for fatigue and fever.   Respiratory:  Negative for cough and shortness of  "breath.    Cardiovascular:  Negative for chest pain, palpitations and leg swelling.   Gastrointestinal:  Negative for blood in stool.        Objective     Vitals:    01/29/24 1404   BP: 125/82   BP Location: Left arm   Patient Position: Sitting   Cuff Size: Large Adult   Pulse: 77   Temp: 98.2 °F (36.8 °C)   TempSrc: Oral   Weight: 116 kg (255 lb 6.4 oz)   Height: 193 cm (76\")            Physical Exam  Vitals reviewed.   Constitutional:       General: He is not in acute distress.     Appearance: Normal appearance.   Neck:      Vascular: No carotid bruit.   Cardiovascular:      Rate and Rhythm: Normal rate and regular rhythm.      Heart sounds: Normal heart sounds. No murmur heard.  Pulmonary:      Effort: Pulmonary effort is normal. No respiratory distress.      Breath sounds: Normal breath sounds.   Musculoskeletal:      Right lower leg: No edema.      Left lower leg: No edema.   Neurological:      Mental Status: He is alert.   Psychiatric:         Mood and Affect: Mood normal.         Behavior: Behavior normal.         Result Review :     The following data was reviewed by: ROBIN Burgos on 01/29/2024:                       Assessment and Plan      Diagnoses and all orders for this visit:    1. Essential hypertension (Primary)  Assessment & Plan:  Hypertension is stable. Continue to monitor BP at home. Continue current meds. Continue to modify diet and lifestyle. To return fasting later this week     Orders:  -     amLODIPine (NORVASC) 5 MG tablet; Take 2 tablets by mouth Daily.  Dispense: 180 tablet; Refill: 0  -     chlorthalidone (HYGROTON) 25 MG tablet; Take 0.5 tablets by mouth Daily.  Dispense: 45 tablet; Refill: 1  -     lisinopril (PRINIVIL,ZESTRIL) 20 MG tablet; Take 1 tablet by mouth Daily.  Dispense: 90 tablet; Refill: 0  -     atenolol (TENORMIN) 25 MG tablet; Take 1 tablet by mouth Daily.  Dispense: 90 tablet; Refill: 0  -     Comprehensive Metabolic Panel; Future  -     Lipid Panel; " Future    2. Screening for viral disease  Assessment & Plan:  Screen for hep C    Orders:  -     Hepatitis C antibody; Future    3. Screen for colon cancer  Assessment & Plan:  He has never had a screening CLN, he will talk to his wife that works as a teacher and see if spring break or the summer will work better for her, since she will need to take him and will get back with me           BMI is >= 30 and <35. (Class 1 Obesity). The following options were offered after discussion;: exercise counseling/recommendations and nutrition counseling/recommendations         Follow Up     Return for fasting for labs.    Patient was given instructions and counseling regarding his condition or for health maintenance advice. Please see specific information pulled into the AVS if appropriate.

## 2024-01-29 NOTE — ASSESSMENT & PLAN NOTE
Hypertension is stable. Continue to monitor BP at home. Continue current meds. Continue to modify diet and lifestyle. To return fasting later this week

## 2024-01-29 NOTE — ASSESSMENT & PLAN NOTE
He has never had a screening CLN, he will talk to his wife that works as a teacher and see if spring break or the summer will work better for her, since she will need to take him and will get back with me

## 2024-03-04 ENCOUNTER — TELEPHONE (OUTPATIENT)
Dept: FAMILY MEDICINE CLINIC | Age: 54
End: 2024-03-04
Payer: COMMERCIAL

## 2024-04-05 DIAGNOSIS — I10 ESSENTIAL HYPERTENSION: ICD-10-CM

## 2024-04-05 RX ORDER — CHLORTHALIDONE 25 MG/1
12.5 TABLET ORAL DAILY
Qty: 45 TABLET | Refills: 1 | Status: SHIPPED | OUTPATIENT
Start: 2024-04-05

## 2024-04-25 DIAGNOSIS — I10 ESSENTIAL HYPERTENSION: ICD-10-CM

## 2024-04-25 NOTE — TELEPHONE ENCOUNTER
Rx Refill Note  Requested Prescriptions     Pending Prescriptions Disp Refills    amLODIPine (NORVASC) 5 MG tablet 180 tablet 0     Sig: Take 2 tablets by mouth Daily.    lisinopril (PRINIVIL,ZESTRIL) 20 MG tablet 90 tablet 0     Sig: Take 1 tablet by mouth Daily.    atenolol (TENORMIN) 25 MG tablet 90 tablet 0     Sig: Take 1 tablet by mouth Daily.      Last office visit with prescribing clinician: 1/29/2024   Last telemedicine visit with prescribing clinician: Visit date not found   Next office visit with prescribing clinician: 7/29/2024  Calcium-Channel Blockers Protocol Failed   ACE Inhibitors Protocol Failed     Eliazbeth Mason LPN  04/25/24, 15:21 EDT

## 2024-04-26 DIAGNOSIS — I10 ESSENTIAL HYPERTENSION: ICD-10-CM

## 2024-04-26 RX ORDER — LISINOPRIL 20 MG/1
20 TABLET ORAL DAILY
Qty: 30 TABLET | Refills: 0 | Status: SHIPPED | OUTPATIENT
Start: 2024-04-26

## 2024-04-26 RX ORDER — AMLODIPINE BESYLATE 5 MG/1
10 TABLET ORAL DAILY
Qty: 180 TABLET | Refills: 0 | OUTPATIENT
Start: 2024-04-26

## 2024-04-26 RX ORDER — AMLODIPINE BESYLATE 5 MG/1
10 TABLET ORAL DAILY
Qty: 60 TABLET | Refills: 0 | Status: SHIPPED | OUTPATIENT
Start: 2024-04-26

## 2024-04-26 RX ORDER — ATENOLOL 25 MG/1
25 TABLET ORAL DAILY
Qty: 90 TABLET | Refills: 0 | OUTPATIENT
Start: 2024-04-26

## 2024-04-26 RX ORDER — ATENOLOL 25 MG/1
25 TABLET ORAL DAILY
Qty: 30 TABLET | Refills: 0 | Status: SHIPPED | OUTPATIENT
Start: 2024-04-26

## 2024-04-26 RX ORDER — LISINOPRIL 20 MG/1
20 TABLET ORAL DAILY
Qty: 90 TABLET | Refills: 0 | OUTPATIENT
Start: 2024-04-26

## 2024-04-26 RX ORDER — LISINOPRIL 20 MG/1
TABLET ORAL
Qty: 90 TABLET | OUTPATIENT
Start: 2024-04-26

## 2024-04-26 RX ORDER — ATENOLOL 25 MG/1
TABLET ORAL
Qty: 90 TABLET | OUTPATIENT
Start: 2024-04-26

## 2024-04-26 RX ORDER — AMLODIPINE BESYLATE 5 MG/1
TABLET ORAL
Qty: 180 TABLET | OUTPATIENT
Start: 2024-04-26

## 2024-04-26 NOTE — TELEPHONE ENCOUNTER
Rx Refill Note  Requested Prescriptions     Pending Prescriptions Disp Refills    atenolol (TENORMIN) 25 MG tablet [Pharmacy Med Name: ATENOLOL 25MG TABLETS] 90 tablet 0     Sig: TAKE 1 TABLET BY MOUTH DAILY    amLODIPine (NORVASC) 5 MG tablet [Pharmacy Med Name: AMLODIPINE BESYLATE 5MG TABLETS] 180 tablet 0     Sig: TAKE 2 TABLETS BY MOUTH DAILY    lisinopril (PRINIVIL,ZESTRIL) 20 MG tablet [Pharmacy Med Name: LISINOPRIL 20MG TABLETS] 90 tablet 0     Sig: TAKE 1 TABLET BY MOUTH DAILY      Last office visit with prescribing clinician: 1/29/2024   Last telemedicine visit with prescribing clinician: Visit date not found   Next office visit with prescribing clinician: 7/29/2024  Comprehensive Metabolic Panel (09/16/2021 08:34)   Pending lab orders from 01/29/24 not completed.     Elizabeth Mason LPN  04/26/24, 10:25 EDT

## 2024-04-26 NOTE — TELEPHONE ENCOUNTER
Duplicate refill request.  Left message on his vm that he needs to come in and complete his lab orders.

## 2024-05-26 DIAGNOSIS — I10 ESSENTIAL HYPERTENSION: ICD-10-CM

## 2024-05-28 DIAGNOSIS — I10 ESSENTIAL HYPERTENSION: ICD-10-CM

## 2024-05-28 RX ORDER — AMLODIPINE BESYLATE 5 MG/1
TABLET ORAL
Qty: 180 TABLET | OUTPATIENT
Start: 2024-05-28

## 2024-05-28 RX ORDER — AMLODIPINE BESYLATE 5 MG/1
TABLET ORAL
Qty: 90 TABLET | Refills: 0 | Status: SHIPPED | OUTPATIENT
Start: 2024-05-28

## 2024-05-28 RX ORDER — LISINOPRIL 20 MG/1
TABLET ORAL
Qty: 90 TABLET | Refills: 0 | Status: SHIPPED | OUTPATIENT
Start: 2024-05-28

## 2024-05-28 RX ORDER — ATENOLOL 25 MG/1
TABLET ORAL
Qty: 90 TABLET | Refills: 0 | Status: SHIPPED | OUTPATIENT
Start: 2024-05-28

## 2024-07-09 DIAGNOSIS — I10 ESSENTIAL HYPERTENSION: ICD-10-CM

## 2024-07-09 RX ORDER — AMLODIPINE BESYLATE 5 MG/1
TABLET ORAL
Qty: 60 TABLET | Refills: 0 | Status: SHIPPED | OUTPATIENT
Start: 2024-07-09

## 2024-08-22 RX ORDER — AMLODIPINE BESYLATE 5 MG/1
TABLET ORAL
Qty: 180 TABLET | OUTPATIENT
Start: 2024-08-22

## 2024-08-23 DIAGNOSIS — I10 ESSENTIAL HYPERTENSION: ICD-10-CM

## 2024-08-23 RX ORDER — LISINOPRIL 20 MG/1
TABLET ORAL
Qty: 90 TABLET | Refills: 0 | OUTPATIENT
Start: 2024-08-23

## 2024-08-23 RX ORDER — ATENOLOL 25 MG/1
TABLET ORAL
Qty: 90 TABLET | Refills: 0 | OUTPATIENT
Start: 2024-08-23

## 2024-08-23 NOTE — TELEPHONE ENCOUNTER
Rx Refill Note  Requested Prescriptions     Pending Prescriptions Disp Refills    atenolol (TENORMIN) 25 MG tablet [Pharmacy Med Name: ATENOLOL 25MG TABLETS] 90 tablet 0     Sig: TAKE 1 TABLET BY MOUTH DAILY. TO COMPLETE LAB ORDERS FROM JANUARY*    lisinopril (PRINIVIL,ZESTRIL) 20 MG tablet [Pharmacy Med Name: LISINOPRIL 20MG TABLETS] 90 tablet 0     Sig: TAKE 1 TABLET BY MOUTH DAILY. TO COMPLETE LAB ORDERS FROM JANUARY*      Last office visit with prescribing clinician: 1/29/2024   Last telemedicine visit with prescribing clinician: Visit date not found   Next office visit with prescribing clinician: Visit date not found    Pt was informed on 08/22/24 by BAM Jones lpn that he need appt and lab work.     Elizabeth Msaon LPN  08/23/24, 09:23 EDT

## 2024-08-26 ENCOUNTER — LAB (OUTPATIENT)
Dept: LAB | Facility: HOSPITAL | Age: 54
End: 2024-08-26
Payer: COMMERCIAL

## 2024-08-26 DIAGNOSIS — I10 ESSENTIAL HYPERTENSION: ICD-10-CM

## 2024-08-26 DIAGNOSIS — Z11.59 SCREENING FOR VIRAL DISEASE: ICD-10-CM

## 2024-08-26 PROCEDURE — 80053 COMPREHEN METABOLIC PANEL: CPT

## 2024-08-26 PROCEDURE — 86803 HEPATITIS C AB TEST: CPT

## 2024-08-26 PROCEDURE — 80061 LIPID PANEL: CPT

## 2024-08-26 PROCEDURE — 36415 COLL VENOUS BLD VENIPUNCTURE: CPT

## 2024-08-27 DIAGNOSIS — I10 ESSENTIAL HYPERTENSION: ICD-10-CM

## 2024-08-27 LAB
ALBUMIN SERPL-MCNC: 4.3 G/DL (ref 3.5–5.2)
ALBUMIN/GLOB SERPL: 1.3 G/DL
ALP SERPL-CCNC: 65 U/L (ref 39–117)
ALT SERPL W P-5'-P-CCNC: 26 U/L (ref 1–41)
ANION GAP SERPL CALCULATED.3IONS-SCNC: 13.3 MMOL/L (ref 5–15)
AST SERPL-CCNC: 21 U/L (ref 1–40)
BILIRUB SERPL-MCNC: 0.4 MG/DL (ref 0–1.2)
BUN SERPL-MCNC: 14 MG/DL (ref 6–20)
BUN/CREAT SERPL: 11 (ref 7–25)
CALCIUM SPEC-SCNC: 10 MG/DL (ref 8.6–10.5)
CHLORIDE SERPL-SCNC: 97 MMOL/L (ref 98–107)
CHOLEST SERPL-MCNC: 177 MG/DL (ref 0–200)
CO2 SERPL-SCNC: 26.7 MMOL/L (ref 22–29)
CREAT SERPL-MCNC: 1.27 MG/DL (ref 0.76–1.27)
EGFRCR SERPLBLD CKD-EPI 2021: 68.4 ML/MIN/1.73
GLOBULIN UR ELPH-MCNC: 3.2 GM/DL
GLUCOSE SERPL-MCNC: 106 MG/DL (ref 65–99)
HCV AB SER QL: NORMAL
HDLC SERPL-MCNC: 38 MG/DL (ref 40–60)
LDLC SERPL CALC-MCNC: 110 MG/DL (ref 0–100)
LDLC/HDLC SERPL: 2.81 {RATIO}
POTASSIUM SERPL-SCNC: 3.4 MMOL/L (ref 3.5–5.2)
PROT SERPL-MCNC: 7.5 G/DL (ref 6–8.5)
SODIUM SERPL-SCNC: 137 MMOL/L (ref 136–145)
TRIGL SERPL-MCNC: 161 MG/DL (ref 0–150)
VLDLC SERPL-MCNC: 29 MG/DL (ref 5–40)

## 2024-08-27 NOTE — TELEPHONE ENCOUNTER
Rx Refill Note  Requested Prescriptions     Pending Prescriptions Disp Refills    lisinopril (PRINIVIL,ZESTRIL) 20 MG tablet 90 tablet 0    atenolol (TENORMIN) 25 MG tablet 90 tablet 0    amLODIPine (NORVASC) 5 MG tablet 60 tablet 0      Last office visit with prescribing clinician: 1/29/2024   Last telemedicine visit with prescribing clinician: Visit date not found   Next office visit with prescribing clinician: Visit date not found    Left message to call back and schedule appt.     Elizabeth Maosn LPN  08/27/24, 16:21 EDT

## 2024-08-29 NOTE — TELEPHONE ENCOUNTER
Caller: Johnathon Bright    Relationship to patient: Self    Best call back number: [phone]     Patient is needing: PATIENT IS OUT OF MEDICATION. HUB SCHEDULED FIRST AVAILABLE APPOINTMENT THAT WORKED WITH THE PATIENTS SCHEDULE FOR 9.10.2024.    PLEASE SEND IN ENOUGH MEDICATION TO GET HIM TO HIS APPOINTMENT PLEASE.    PLEASE CONTACT PATIENT TO ADVISE.         MYCHART YES OR CALL MAY LEAVE VOICEMAIL.

## 2024-08-30 DIAGNOSIS — I10 ESSENTIAL HYPERTENSION: ICD-10-CM

## 2024-08-30 RX ORDER — LISINOPRIL 20 MG/1
20 TABLET ORAL DAILY
Qty: 90 TABLET | OUTPATIENT
Start: 2024-08-30

## 2024-08-30 RX ORDER — AMLODIPINE BESYLATE 5 MG/1
10 TABLET ORAL DAILY
Qty: 180 TABLET | OUTPATIENT
Start: 2024-08-30

## 2024-08-30 RX ORDER — ATENOLOL 25 MG/1
25 TABLET ORAL DAILY
Qty: 30 TABLET | Refills: 0 | Status: SHIPPED | OUTPATIENT
Start: 2024-08-30

## 2024-08-30 RX ORDER — ATENOLOL 25 MG/1
25 TABLET ORAL DAILY
Qty: 90 TABLET | OUTPATIENT
Start: 2024-08-30

## 2024-08-30 RX ORDER — LISINOPRIL 20 MG/1
20 TABLET ORAL DAILY
Qty: 30 TABLET | Refills: 0 | Status: SHIPPED | OUTPATIENT
Start: 2024-08-30

## 2024-08-30 RX ORDER — AMLODIPINE BESYLATE 5 MG/1
10 TABLET ORAL DAILY
Qty: 60 TABLET | Refills: 0 | Status: SHIPPED | OUTPATIENT
Start: 2024-08-30

## 2024-09-26 DIAGNOSIS — I10 ESSENTIAL HYPERTENSION: ICD-10-CM

## 2024-09-26 RX ORDER — AMLODIPINE BESYLATE 5 MG/1
10 TABLET ORAL DAILY
Qty: 180 TABLET | OUTPATIENT
Start: 2024-09-26

## 2024-09-26 RX ORDER — LISINOPRIL 20 MG/1
20 TABLET ORAL DAILY
Qty: 30 TABLET | Refills: 0 | OUTPATIENT
Start: 2024-09-26

## 2024-09-26 RX ORDER — CHLORTHALIDONE 25 MG/1
12.5 TABLET ORAL DAILY
Qty: 45 TABLET | OUTPATIENT
Start: 2024-09-26

## 2024-09-26 RX ORDER — ATENOLOL 25 MG/1
25 TABLET ORAL DAILY
Qty: 30 TABLET | Refills: 0 | OUTPATIENT
Start: 2024-09-26

## 2024-09-26 RX ORDER — CHLORTHALIDONE 25 MG/1
12.5 TABLET ORAL DAILY
Qty: 15 TABLET | Refills: 0 | Status: SHIPPED | OUTPATIENT
Start: 2024-09-26

## 2024-09-26 RX ORDER — AMLODIPINE BESYLATE 5 MG/1
10 TABLET ORAL DAILY
Qty: 60 TABLET | Refills: 0 | OUTPATIENT
Start: 2024-09-26

## 2024-09-26 RX ORDER — LISINOPRIL 20 MG/1
20 TABLET ORAL DAILY
Qty: 30 TABLET | Refills: 0 | Status: SHIPPED | OUTPATIENT
Start: 2024-09-26

## 2024-09-26 RX ORDER — LISINOPRIL 20 MG/1
20 TABLET ORAL DAILY
Qty: 90 TABLET | OUTPATIENT
Start: 2024-09-26

## 2024-09-26 RX ORDER — AMLODIPINE BESYLATE 5 MG/1
10 TABLET ORAL DAILY
Qty: 60 TABLET | Refills: 0 | Status: SHIPPED | OUTPATIENT
Start: 2024-09-26

## 2024-09-26 NOTE — TELEPHONE ENCOUNTER
ACE Inhibitors Protocol Failed     Diuretics Protocol Failed      Calcium-Channel Blockers Protocol Failed     RADHA Daniels is out of the office until 10/08/24, sent to on call Dr Araujo.

## 2024-09-26 NOTE — TELEPHONE ENCOUNTER
Caller: Johnathon Bright LUIS    Relationship: Self    Best call back number: [phone]    Requested Prescriptions:   Requested Prescriptions     Pending Prescriptions Disp Refills    lisinopril (PRINIVIL,ZESTRIL) 20 MG tablet 30 tablet 0     Sig: Take 1 tablet by mouth Daily.    chlorthalidone (HYGROTON) 25 MG tablet 45 tablet 1     Sig: Take 0.5 tablets by mouth Daily.    amLODIPine (NORVASC) 5 MG tablet 60 tablet 0     Sig: Take 2 tablets by mouth Daily.        Pharmacy where request should be sent: [id number] [address] [address] [address] [address] [address] [address] - [phone]  - [phone] FX     Last office visit with prescribing clinician: 1/29/2024   Last telemedicine visit with prescribing clinician: Visit date not found   Next office visit with prescribing clinician: 10/15/2024     Additional details provided by patient:     Does the patient have less than a 3 day supply:  [x] Yes  [] No    Would you like a call back once the refill request has been completed: [] Yes [] No    If the office needs to give you a call back, can they leave a voicemail: [] Yes [] No    Sakshi Marx Rep   09/26/24 13:16 EDT

## 2024-09-27 DIAGNOSIS — I10 ESSENTIAL HYPERTENSION: ICD-10-CM

## 2024-09-27 RX ORDER — ATENOLOL 25 MG/1
25 TABLET ORAL DAILY
Qty: 15 TABLET | Refills: 0 | Status: SHIPPED | OUTPATIENT
Start: 2024-09-27

## 2024-10-09 DIAGNOSIS — I10 ESSENTIAL HYPERTENSION: ICD-10-CM

## 2024-10-09 RX ORDER — ATENOLOL 25 MG/1
25 TABLET ORAL DAILY
Qty: 15 TABLET | Refills: 0 | Status: SHIPPED | OUTPATIENT
Start: 2024-10-09

## 2024-10-24 DIAGNOSIS — I10 ESSENTIAL HYPERTENSION: ICD-10-CM

## 2024-10-24 RX ORDER — ATENOLOL 25 MG/1
25 TABLET ORAL DAILY
Qty: 7 TABLET | Refills: 0 | Status: SHIPPED | OUTPATIENT
Start: 2024-10-24

## 2024-10-24 RX ORDER — LISINOPRIL 20 MG/1
20 TABLET ORAL DAILY
Qty: 7 TABLET | Refills: 0 | Status: SHIPPED | OUTPATIENT
Start: 2024-10-24

## 2024-10-24 RX ORDER — AMLODIPINE BESYLATE 5 MG/1
10 TABLET ORAL DAILY
Qty: 7 TABLET | Refills: 0 | Status: SHIPPED | OUTPATIENT
Start: 2024-10-24

## 2024-10-26 DIAGNOSIS — I10 ESSENTIAL HYPERTENSION: ICD-10-CM

## 2024-10-27 DIAGNOSIS — I10 ESSENTIAL HYPERTENSION: ICD-10-CM

## 2024-10-28 DIAGNOSIS — I10 ESSENTIAL HYPERTENSION: ICD-10-CM

## 2024-10-28 RX ORDER — AMLODIPINE BESYLATE 5 MG/1
10 TABLET ORAL DAILY
Qty: 7 TABLET | Refills: 0 | OUTPATIENT
Start: 2024-10-28

## 2024-10-29 DIAGNOSIS — I10 ESSENTIAL HYPERTENSION: ICD-10-CM

## 2024-10-29 NOTE — TELEPHONE ENCOUNTER
LV:1/29/24  LF:10/24/24 #7 by Dr Araujo on call  Pt has appt scheduled 10/31/24    Lab Results   Component Value Date    GLUCOSE 106 (H) 08/26/2024    BUN 14 08/26/2024    CREATININE 1.27 08/26/2024     08/26/2024    K 3.4 (L) 08/26/2024    CL 97 (L) 08/26/2024    CALCIUM 10.0 08/26/2024    PROTEINTOT 7.5 08/26/2024    ALBUMIN 4.3 08/26/2024    ALT 26 08/26/2024    AST 21 08/26/2024    ALKPHOS 65 08/26/2024    BILITOT 0.4 08/26/2024    GLOB 3.2 08/26/2024    AGRATIO 1.3 08/26/2024    BCR 11.0 08/26/2024    ANIONGAP 13.3 08/26/2024    EGFR 68.4 08/26/2024

## 2024-10-30 NOTE — TELEPHONE ENCOUNTER
Rx Refill Note  Requested Prescriptions     Pending Prescriptions Disp Refills    atenolol (TENORMIN) 25 MG tablet [Pharmacy Med Name: ATENOLOL 25MG TABLETS] 7 tablet 0     Sig: TAKE 1 TABLET BY MOUTH DAILY      Last office visit with prescribing clinician: 1/29/2024   Last telemedicine visit with prescribing clinician: Visit date not found   Next office visit with prescribing clinician: 10/31/2024  Will be out tomorrow.     Elizabeth Mason LPN  10/30/24, 14:34 EDT

## 2024-10-31 ENCOUNTER — OFFICE VISIT (OUTPATIENT)
Dept: FAMILY MEDICINE CLINIC | Age: 54
End: 2024-10-31
Payer: COMMERCIAL

## 2024-10-31 ENCOUNTER — LAB (OUTPATIENT)
Dept: LAB | Facility: HOSPITAL | Age: 54
End: 2024-10-31
Payer: COMMERCIAL

## 2024-10-31 VITALS
HEART RATE: 61 BPM | BODY MASS INDEX: 29.1 KG/M2 | SYSTOLIC BLOOD PRESSURE: 119 MMHG | HEIGHT: 76 IN | DIASTOLIC BLOOD PRESSURE: 60 MMHG | WEIGHT: 239 LBS | TEMPERATURE: 97.9 F

## 2024-10-31 DIAGNOSIS — I10 ESSENTIAL HYPERTENSION: ICD-10-CM

## 2024-10-31 LAB
ANION GAP SERPL CALCULATED.3IONS-SCNC: 12.2 MMOL/L (ref 5–15)
BUN SERPL-MCNC: 14 MG/DL (ref 6–20)
BUN/CREAT SERPL: 10.1 (ref 7–25)
CALCIUM SPEC-SCNC: 10.1 MG/DL (ref 8.6–10.5)
CHLORIDE SERPL-SCNC: 100 MMOL/L (ref 98–107)
CO2 SERPL-SCNC: 28.8 MMOL/L (ref 22–29)
CREAT SERPL-MCNC: 1.39 MG/DL (ref 0.76–1.27)
EGFRCR SERPLBLD CKD-EPI 2021: 61.4 ML/MIN/1.73
GLUCOSE SERPL-MCNC: 104 MG/DL (ref 65–99)
POTASSIUM SERPL-SCNC: 3.6 MMOL/L (ref 3.5–5.2)
SODIUM SERPL-SCNC: 141 MMOL/L (ref 136–145)

## 2024-10-31 PROCEDURE — 80048 BASIC METABOLIC PNL TOTAL CA: CPT

## 2024-10-31 PROCEDURE — 99213 OFFICE O/P EST LOW 20 MIN: CPT | Performed by: NURSE PRACTITIONER

## 2024-10-31 PROCEDURE — 36415 COLL VENOUS BLD VENIPUNCTURE: CPT

## 2024-10-31 RX ORDER — CHLORTHALIDONE 25 MG/1
12.5 TABLET ORAL DAILY
Qty: 90 TABLET | Refills: 1 | Status: SHIPPED | OUTPATIENT
Start: 2024-10-31

## 2024-10-31 RX ORDER — ATENOLOL 25 MG/1
25 TABLET ORAL DAILY
Qty: 90 TABLET | Refills: 1 | Status: SHIPPED | OUTPATIENT
Start: 2024-10-31

## 2024-10-31 RX ORDER — LISINOPRIL 20 MG/1
20 TABLET ORAL DAILY
Qty: 90 TABLET | Refills: 1 | Status: SHIPPED | OUTPATIENT
Start: 2024-10-31

## 2024-10-31 RX ORDER — AMLODIPINE BESYLATE 5 MG/1
10 TABLET ORAL DAILY
Qty: 90 TABLET | Refills: 1 | Status: SHIPPED | OUTPATIENT
Start: 2024-10-31

## 2024-10-31 RX ORDER — AMLODIPINE BESYLATE 5 MG/1
10 TABLET ORAL DAILY
Qty: 7 TABLET | Refills: 0 | OUTPATIENT
Start: 2024-10-31

## 2024-10-31 RX ORDER — LISINOPRIL 20 MG/1
20 TABLET ORAL DAILY
Qty: 7 TABLET | Refills: 0 | OUTPATIENT
Start: 2024-10-31

## 2024-10-31 RX ORDER — ATENOLOL 25 MG/1
25 TABLET ORAL DAILY
Qty: 7 TABLET | Refills: 0 | OUTPATIENT
Start: 2024-10-31

## 2024-10-31 NOTE — PROGRESS NOTES
Chief Complaint  Hypertension (6 month follow up )    Subjective          Johnathon Bright presents to [facility]    History of Present Illness  Hypertension:  Current medication: norvasc, tenormin, Lisinopril and hygroton  Tolerating Medication: Yes  Checking BP at home and it is: <120/70  Needs refills: Yes to [facility]  Labs:  Lab Results       Component                Value               Date                       GLUCOSE                  106 (H)             2024                 BUN                      14                  2024                 CREATININE               1.27                2024                 EGFRIFNONA               51 (L)              2021                 BCR                      11.0                2024                 K                        3.4 (L)             2024                 CO2                      26.7                2024                 CALCIUM                  10.0                2024                 ALBUMIN                  4.3                 2024                 AST                      21                  2024                 ALT                      26                  2024              PMH changes since 2024:      HTN  JASMYN  Sees [facility] Dr Vaz and the NP     Surgery:     Right forearm fracture as senior and then removed Hardware( ,  removal)      Family history:     Mother HTN  Father : CAD,  age 66]  Sister: 1 healthy   Daughters: 2, healthy daughters     Social:    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[employer]    2 children           Past Medical History:   Diagnosis Date    Allergy     Arm fracture     Elevated blood pressure     Hypertension     Sinusitis        No Known Allergies     Past Surgical History:   Procedure Laterality Date    ARM HARDWARE REMOVAL          Social History     Tobacco Use    Smoking  status: Former     Current packs/day: 0.00     Types: Cigarettes     Quit date: 2017     Years since quittin.7     Passive exposure: Past    Smokeless tobacco: Current     Types: Chew    Tobacco comments:     Caffeine - 1   Substance Use Topics    Alcohol use: Yes     Alcohol/week: 1.0 standard drink of alcohol     Types: 1 Shots of liquor per week     Comment: 2 on the weekends       Family History   Problem Relation Age of Onset    Hypertension Mother     Sudden death Father     Hypertension Father     Heart attack Father     Arrhythmia Paternal Grandmother     Obesity Paternal Grandmother     Heart disease Paternal Grandmother     Pancreatic cancer Paternal Grandfather     Dementia Paternal Grandfather     Arrhythmia Sister     No Known Problems Maternal Grandmother     No Known Problems Maternal Grandfather         Health Maintenance Due   Topic Date Due    COLORECTAL CANCER SCREENING  Never done    ANNUAL PHYSICAL  Never done    ZOSTER VACCINE (1 of 2) Never done        Current Outpatient Medications on File Prior to Visit   Medication Sig    tadalafil (Cialis) 20 MG tablet Take 1 tablet by mouth Daily As Needed for Erectile Dysfunction.    [DISCONTINUED] amLODIPine (NORVASC) 5 MG tablet TAKE 2 TABLETS BY MOUTH DAILY    [DISCONTINUED] atenolol (TENORMIN) 25 MG tablet TAKE 1 TABLET BY MOUTH DAILY    [DISCONTINUED] chlorthalidone (HYGROTON) 25 MG tablet Take 0.5 tablets by mouth Daily.    [DISCONTINUED] lisinopril (PRINIVIL,ZESTRIL) 20 MG tablet TAKE 1 TABLET BY MOUTH DAILY     No current facility-administered medications on file prior to visit.       Immunization History   Administered Date(s) Administered    COVID-19 (MODERNA) 1st,2nd,3rd Dose Monovalent 2021, 2021    Tdap 2017       Review of Systems   Constitutional:  Negative for fatigue and fever.   Respiratory:  Negative for cough and shortness of breath.    Cardiovascular:  Negative for chest pain, palpitations and leg swelling.  "       Objective     Vitals:    10/31/24 1352   BP: 119/60   BP Location: Right arm   Patient Position: Sitting   Cuff Size: Large Adult   Pulse: 61   Temp: 97.9 °F (36.6 °C)   TempSrc: Oral   Weight: 108 kg (239 lb)   Height: 193 cm (76\")            Physical Exam  Vitals reviewed.   Constitutional:       General: He is not in acute distress.     Appearance: Normal appearance.   Neck:      Vascular: No carotid bruit.   Cardiovascular:      Rate and Rhythm: Normal rate and regular rhythm.      Heart sounds: Normal heart sounds. No murmur heard.  Pulmonary:      Effort: Pulmonary effort is normal. No respiratory distress.      Breath sounds: Normal breath sounds.   Musculoskeletal:      Right lower leg: No edema.      Left lower leg: No edema.   Neurological:      Mental Status: He is alert.   Psychiatric:         Mood and Affect: Mood normal.         Behavior: Behavior normal.         Result Review :     The following data was reviewed by: ROBIN Burgos on 10/31/2024:                       Assessment and Plan      Diagnoses and all orders for this visit:    1. Essential hypertension  Assessment & Plan:  Hypertension is stable. Continue to monitor BP at home. Continue current meds. Continue to modify diet and lifestyle. Reviewed last labs, rechecking a BMP today.  Discussed vaccines, not interested in any updates today.       Orders:  -     Basic metabolic panel; Future  -     amLODIPine (NORVASC) 5 MG tablet; Take 2 tablets by mouth Daily.  Dispense: 90 tablet; Refill: 1  -     chlorthalidone (HYGROTON) 25 MG tablet; Take 0.5 tablets by mouth Daily.  Dispense: 90 tablet; Refill: 1  -     lisinopril (PRINIVIL,ZESTRIL) 20 MG tablet; Take 1 tablet by mouth Daily.  Dispense: 90 tablet; Refill: 1  -     atenolol (TENORMIN) 25 MG tablet; Take 1 tablet by mouth Daily.  Dispense: 90 tablet; Refill: 1                  Follow Up     Return for followup pending lab results.    Patient was given instructions and " counseling regarding his condition or for health maintenance advice. Please see specific information pulled into the AVS if appropriate.

## 2024-10-31 NOTE — ASSESSMENT & PLAN NOTE
Hypertension is stable. Continue to monitor BP at home. Continue current meds. Continue to modify diet and lifestyle. Reviewed last labs, rechecking a BMP today.  Discussed vaccines, not interested in any updates today.

## 2024-10-31 NOTE — TELEPHONE ENCOUNTER
Rx Refill Note  Requested Prescriptions     Pending Prescriptions Disp Refills    amLODIPine (NORVASC) 5 MG tablet [Pharmacy Med Name: AMLODIPINE BESYLATE 5MG TABLETS] 7 tablet 0     Sig: TAKE 2 TABLETS BY MOUTH DAILY      Last office visit with prescribing clinician: Visit date not found   Last telemedicine visit with prescribing clinician: Visit date not found   Next office visit with prescribing clinician: 10/28/2024    # 7 pills sent in on 10/24/24.  Pt has an appt today to be seen.     Elizabeth Mason LPN  10/31/24, 09:48 EDT

## 2024-11-01 DIAGNOSIS — N28.9 ABNORMAL RENAL FUNCTION: Primary | ICD-10-CM

## 2024-11-01 DIAGNOSIS — I10 ESSENTIAL HYPERTENSION: ICD-10-CM

## 2024-12-16 ENCOUNTER — TELEPHONE (OUTPATIENT)
Dept: FAMILY MEDICINE CLINIC | Age: 54
End: 2024-12-16
Payer: COMMERCIAL

## 2024-12-16 NOTE — TELEPHONE ENCOUNTER
LMTRC regarding overdue BMP ordered on 11-1-24, due on 12-1-24. 1st attempt.  Hub to relay: patient is due for repeat lab work, order in chart

## 2025-01-30 DIAGNOSIS — I10 ESSENTIAL HYPERTENSION: ICD-10-CM

## 2025-01-30 RX ORDER — AMLODIPINE BESYLATE 5 MG/1
10 TABLET ORAL DAILY
Qty: 180 TABLET | Refills: 1 | Status: SHIPPED | OUTPATIENT
Start: 2025-01-30

## 2025-04-26 DIAGNOSIS — I10 ESSENTIAL HYPERTENSION: ICD-10-CM

## 2025-04-28 RX ORDER — CHLORTHALIDONE 25 MG/1
12.5 TABLET ORAL DAILY
Qty: 15 TABLET | Refills: 0 | Status: SHIPPED | OUTPATIENT
Start: 2025-04-28

## 2025-04-30 DIAGNOSIS — I10 ESSENTIAL HYPERTENSION: ICD-10-CM

## 2025-04-30 RX ORDER — ATENOLOL 25 MG/1
25 TABLET ORAL DAILY
Qty: 90 TABLET | Refills: 0 | Status: SHIPPED | OUTPATIENT
Start: 2025-04-30

## 2025-04-30 RX ORDER — LISINOPRIL 20 MG/1
20 TABLET ORAL DAILY
Qty: 90 TABLET | Refills: 0 | Status: SHIPPED | OUTPATIENT
Start: 2025-04-30

## 2025-07-30 DIAGNOSIS — I10 ESSENTIAL HYPERTENSION: ICD-10-CM

## 2025-07-30 RX ORDER — LISINOPRIL 20 MG/1
20 TABLET ORAL DAILY
Qty: 30 TABLET | Refills: 0 | Status: SHIPPED | OUTPATIENT
Start: 2025-07-30

## 2025-07-30 RX ORDER — ATENOLOL 25 MG/1
25 TABLET ORAL DAILY
Qty: 30 TABLET | Refills: 0 | Status: SHIPPED | OUTPATIENT
Start: 2025-07-30

## 2025-07-30 RX ORDER — AMLODIPINE BESYLATE 5 MG/1
10 TABLET ORAL DAILY
Qty: 60 TABLET | Refills: 0 | Status: SHIPPED | OUTPATIENT
Start: 2025-07-30

## 2025-07-30 NOTE — TELEPHONE ENCOUNTER
Rx Refill Note  Requested Prescriptions     Pending Prescriptions Disp Refills    lisinopril (PRINIVIL,ZESTRIL) 20 MG tablet [Pharmacy Med Name: LISINOPRIL 20MG TABLETS] 90 tablet 0     Sig: TAKE 1 TABLET BY MOUTH DAILY    amLODIPine (NORVASC) 5 MG tablet [Pharmacy Med Name: AMLODIPINE BESYLATE 5MG TABLETS] 180 tablet 1     Sig: TAKE 2 TABLETS BY MOUTH DAILY    atenolol (TENORMIN) 25 MG tablet [Pharmacy Med Name: ATENOLOL 25MG TABLETS] 90 tablet 0     Sig: TAKE 1 TABLET BY MOUTH DAILY      Last office visit with prescribing clinician: 10/31/2024     Next office visit with prescribing clinician: 8/11/2025    Last filled 4/30/25  #90 on each    Jen Jones LPN  07/30/25, 08:25 EDT    Patient does not have enough meds to cover until his appt on 8/11/25

## 2025-08-11 ENCOUNTER — OFFICE VISIT (OUTPATIENT)
Dept: FAMILY MEDICINE CLINIC | Age: 55
End: 2025-08-11
Payer: COMMERCIAL

## 2025-08-11 VITALS
HEIGHT: 76 IN | TEMPERATURE: 98.1 F | SYSTOLIC BLOOD PRESSURE: 122 MMHG | OXYGEN SATURATION: 98 % | BODY MASS INDEX: 31.78 KG/M2 | DIASTOLIC BLOOD PRESSURE: 77 MMHG | HEART RATE: 70 BPM | WEIGHT: 261 LBS

## 2025-08-11 DIAGNOSIS — I10 ESSENTIAL HYPERTENSION: Primary | ICD-10-CM

## 2025-08-11 PROCEDURE — 99213 OFFICE O/P EST LOW 20 MIN: CPT | Performed by: NURSE PRACTITIONER

## 2025-08-27 DIAGNOSIS — I10 ESSENTIAL HYPERTENSION: ICD-10-CM

## 2025-08-27 RX ORDER — LISINOPRIL 20 MG/1
20 TABLET ORAL DAILY
Qty: 30 TABLET | Refills: 1 | Status: SHIPPED | OUTPATIENT
Start: 2025-08-27

## 2025-08-27 RX ORDER — AMLODIPINE BESYLATE 5 MG/1
10 TABLET ORAL DAILY
Qty: 60 TABLET | Refills: 1 | Status: SHIPPED | OUTPATIENT
Start: 2025-08-27

## 2025-08-27 RX ORDER — ATENOLOL 25 MG/1
25 TABLET ORAL DAILY
Qty: 30 TABLET | Refills: 1 | Status: SHIPPED | OUTPATIENT
Start: 2025-08-27